# Patient Record
Sex: FEMALE | Race: BLACK OR AFRICAN AMERICAN | NOT HISPANIC OR LATINO | Employment: OTHER | URBAN - METROPOLITAN AREA
[De-identification: names, ages, dates, MRNs, and addresses within clinical notes are randomized per-mention and may not be internally consistent; named-entity substitution may affect disease eponyms.]

---

## 2020-12-18 DIAGNOSIS — Z12.31 ENCOUNTER FOR SCREENING MAMMOGRAM FOR BREAST CANCER: Primary | ICD-10-CM

## 2021-09-08 DIAGNOSIS — R10.13 EPIGASTRIC PAIN: Primary | ICD-10-CM

## 2021-09-17 ENCOUNTER — HOSPITAL ENCOUNTER (OUTPATIENT)
Dept: RADIOLOGY | Facility: HOSPITAL | Age: 56
Discharge: HOME | End: 2021-09-17
Attending: NURSE PRACTITIONER
Payer: MEDICARE

## 2021-09-17 DIAGNOSIS — R10.13 EPIGASTRIC PAIN: ICD-10-CM

## 2021-09-17 PROCEDURE — 76700 US EXAM ABDOM COMPLETE: CPT | Mod: TC,PO

## 2021-12-06 ENCOUNTER — TELEPHONE (OUTPATIENT)
Dept: SURGERY | Facility: CLINIC | Age: 56
End: 2021-12-06
Payer: MEDICARE

## 2022-01-11 ENCOUNTER — OFFICE VISIT (OUTPATIENT)
Dept: SURGERY | Facility: CLINIC | Age: 57
End: 2022-01-11
Payer: MEDICARE

## 2022-01-11 VITALS — DIASTOLIC BLOOD PRESSURE: 71 MMHG | TEMPERATURE: 98 F | HEART RATE: 112 BPM | SYSTOLIC BLOOD PRESSURE: 136 MMHG

## 2022-01-11 DIAGNOSIS — K80.10 CHRONIC CALCULOUS CHOLECYSTITIS: Primary | ICD-10-CM

## 2022-01-11 PROCEDURE — 99204 OFFICE O/P NEW MOD 45 MIN: CPT | Mod: S$GLB,,, | Performed by: SURGERY

## 2022-01-11 PROCEDURE — 99204 PR OFFICE/OUTPT VISIT, NEW, LEVL IV, 45-59 MIN: ICD-10-PCS | Mod: S$GLB,,, | Performed by: SURGERY

## 2022-01-11 RX ORDER — METFORMIN HYDROCHLORIDE 1000 MG/1
1000 TABLET ORAL 2 TIMES DAILY
COMMUNITY
Start: 2021-09-10

## 2022-01-11 RX ORDER — LISINOPRIL AND HYDROCHLOROTHIAZIDE 12.5; 2 MG/1; MG/1
1 TABLET ORAL DAILY
COMMUNITY

## 2022-01-11 RX ORDER — AMLODIPINE BESYLATE 10 MG/1
10 TABLET ORAL DAILY
COMMUNITY

## 2022-01-11 RX ORDER — ROSUVASTATIN CALCIUM 40 MG/1
40 TABLET, COATED ORAL DAILY
COMMUNITY

## 2022-01-11 NOTE — PROGRESS NOTES
Subjective:       Patient ID: Papito Alberto is a 56 y.o. female.    Chief Complaint: Consult (Gallbladder )      HPI:  56 year old female referred to the office with biliary symptoms and gallstones on imaging. She first noticed RUQ and epigastric abdominal pain seven months ago. Pain worse with meals and also associated with nausea. US shows gallstones with largest measuring 13 mm. She states symptoms are daily. She has no fever of chills. She has past surgical history of .     No past medical history on file.  No past surgical history on file.  Review of patient's allergies indicates:  No Known Allergies  Medication List with Changes/Refills   Current Medications    AMLODIPINE (NORVASC) 10 MG TABLET    amlodipine 10 mg tablet   TAKE 1 TABLET BY MOUTH ONCE DAILY    LISINOPRIL-HYDROCHLOROTHIAZIDE (PRINZIDE,ZESTORETIC) 20-12.5 MG PER TABLET    lisinopril 20 mg-hydrochlorothiazide 12.5 mg tablet   TAKE 1 TABLET BY MOUTH ONCE DAILY    METFORMIN (GLUCOPHAGE) 1000 MG TABLET    Take 1,000 mg by mouth 2 (two) times daily.    ROSUVASTATIN (CRESTOR) 40 MG TAB    Take 40 mg by mouth.     No family history on file.  Social History     Socioeconomic History    Marital status:          Review of Systems   Constitutional: Negative for appetite change, chills, fever and unexpected weight change.   HENT: Negative for hearing loss, rhinorrhea, sore throat and voice change.    Eyes: Negative for photophobia and visual disturbance.   Respiratory: Negative for cough, choking and shortness of breath.    Cardiovascular: Negative for chest pain, palpitations and leg swelling.   Gastrointestinal: Positive for abdominal pain and nausea. Negative for blood in stool, constipation, diarrhea and vomiting.   Endocrine: Negative for cold intolerance, heat intolerance, polydipsia and polyuria.   Musculoskeletal: Negative for arthralgias, back pain, joint swelling and neck stiffness.   Skin: Negative for color change, pallor and  rash.   Neurological: Negative for dizziness, seizures, syncope and headaches.   Hematological: Negative for adenopathy. Does not bruise/bleed easily.   Psychiatric/Behavioral: Negative for agitation, behavioral problems and confusion.       Objective:      Physical Exam  Constitutional:       General: She is not in acute distress.     Appearance: Normal appearance. She is well-developed and well-nourished. She is not toxic-appearing.   HENT:      Head: Normocephalic and atraumatic. No abrasion or laceration.      Right Ear: External ear normal.      Left Ear: External ear normal.      Nose: Nose normal.      Mouth/Throat:      Mouth: Oropharynx is clear and moist.   Eyes:      Extraocular Movements: EOM normal.      Pupils: Pupils are equal, round, and reactive to light.   Neck:      Trachea: Trachea and phonation normal. No tracheal deviation.   Cardiovascular:      Rate and Rhythm: Normal rate and regular rhythm.   Pulmonary:      Effort: Pulmonary effort is normal. No tachypnea, accessory muscle usage or respiratory distress.   Abdominal:      General: There is no distension.      Palpations: Abdomen is soft. There is no hepatosplenomegaly or mass.      Tenderness: There is no abdominal tenderness. There is no guarding or rebound.   Musculoskeletal:      Cervical back: Neck supple. Normal range of motion.   Lymphadenopathy:      Cervical: No cervical adenopathy.      Upper Body:   No axillary adenopathy present.     Lower Body: No right inguinal adenopathy. No left inguinal adenopathy.   Skin:     General: Skin is warm and intact.   Neurological:      Mental Status: She is alert and oriented to person, place, and time.      Coordination: Coordination normal.      Gait: Gait normal.   Psychiatric:         Mood and Affect: Mood and affect normal.         Speech: Speech normal.         Behavior: Behavior normal.         Assessment/Plan:   Papito was seen today for consult.    Diagnoses and all orders for this  visit:    Chronic calculous cholecystitis  -     Full code; Standing  -     Insert peripheral IV; Standing  -     Case Request Operating Room: CHOLECYSTECTOMY, LAPAROSCOPIC  -     Comprehensive metabolic panel; Future  -     CBC auto differential; Future  -     EKG 12-lead; Future  -     X-Ray Chest PA And Lateral; Future  -     Full code  -     Insert peripheral IV    Other orders  -     ceFOXItin (MEFOXIN) 2 g in dextrose 5 % 50 mL IVPB        Patient has biliary symptoms with gallstones on imaging.  Will plan on cholecystectomy February 2nd.  Risks and benefits of surgery discussed with the patient.      I discussed the proposed procedures with the patient including risks, benefits, indications, alternatives and special concerns.  The patient appears to understand and agrees to go ahead with surgery.  I have made no promises, warranties or verbal agreements beyond what was discussed above.    No follow-ups on file.

## 2022-01-28 ENCOUNTER — HOSPITAL ENCOUNTER (OUTPATIENT)
Dept: PREADMISSION TESTING | Facility: HOSPITAL | Age: 57
Discharge: HOME | End: 2022-01-28
Attending: SURGERY
Payer: MEDICARE

## 2022-01-28 ENCOUNTER — HOSPITAL ENCOUNTER (OUTPATIENT)
Dept: RADIOLOGY | Facility: HOSPITAL | Age: 57
Discharge: HOME | End: 2022-01-28
Attending: SURGERY
Payer: MEDICARE

## 2022-01-28 VITALS
OXYGEN SATURATION: 99 % | DIASTOLIC BLOOD PRESSURE: 80 MMHG | WEIGHT: 220 LBS | HEIGHT: 65 IN | RESPIRATION RATE: 18 BRPM | HEART RATE: 86 BPM | BODY MASS INDEX: 36.65 KG/M2 | SYSTOLIC BLOOD PRESSURE: 118 MMHG

## 2022-01-28 DIAGNOSIS — K80.10 CHRONIC CALCULOUS CHOLECYSTITIS: ICD-10-CM

## 2022-01-28 DIAGNOSIS — Z01.818 PRE-OP TESTING: Primary | ICD-10-CM

## 2022-01-28 PROCEDURE — 71046 X-RAY EXAM CHEST 2 VIEWS: CPT | Mod: TC

## 2022-01-28 NOTE — DISCHARGE INSTRUCTIONS
To confirm, Your doctor has instructed you that surgery is scheduled for:   Wednesday, February 2, 2022    Pre-Op will call the afternoon prior to surgery between 4:00 and 6:00 PM with the final arrival time.    Tuesday, February 1, 2022    Please report to [facility] [place] via [facility] entrance. Check in at registration desk.    Do not eat or drink anything after midnight the night before your surgery - THIS INCLUDES  WATER, GUM, MINTS AND CANDY.  YOU MAY BRUSH YOUR TEETH BUT DO NOT SWALLOW     TAKE ONLY THESE MEDICATIONS WITH A SMALL SIP OF WATER THE MORNING OF YOUR PROCEDURE:  AMLODIPINE      ONLY if you are diabetic, check your sugar in the morning before your procedure.       Do not take any diabetic medicines or insulin the morning of surgery .     PLEASE NOTE:  The surgery schedule has many variables which may affect the time of your surgery case.  Family members should be available if your surgery time changes.  Plan to be here the day of your procedure between 4-6 hours.      DO NOT TAKE THESE MEDICATIONS 5-7 DAYS PRIOR to your procedure or per your surgeon's request: ASPIRIN, ALEVE, ADVIL, IBUPROFEN,  LANI SELTZER, BC , FISH OIL , VITAMIN E, HERBALS  (May take Tylenol)                                                          IMPORTANT INSTRUCTIONS    · Do not smoke, vape or drink alcoholic beverages 24 hours prior to your procedure.  · Shower the night before AND the morning of your procedure with a Chlorhexidine wash such as Hibiclens or Dial antibacterial soap from the neck down.   ·  Do not get it on your face or in your eyes.  You may use your own shampoo and face wash. This helps your skin to be as bacteria free as possible.   ·  DO NOT remove hair from the surgery site.  Do not shave the incision site unless you are given specific instructions to do so.    · Sleep in a bed with clean sheets.  Do not sleep with a pet in the bed.   · Please leave all jewelry, piercing's and valuables at home.      · Make arrangements in advance for transportation home by a responsible adult.    · You must make arrangements for transportation, TAXI'S, UBER'S OR LYFTS ARE NOT ALLOWED.      If you have any questions about these instructions, call Pre-Op Admit  Nursing at [phone] or the Pre-Op Day Surgery Unit at [phone].

## 2022-01-31 ENCOUNTER — HOSPITAL ENCOUNTER (OUTPATIENT)
Dept: PREADMISSION TESTING | Facility: HOSPITAL | Age: 57
Discharge: HOME | End: 2022-01-31
Attending: SURGERY
Payer: MEDICARE

## 2022-01-31 DIAGNOSIS — Z01.818 PREOP TESTING: Primary | ICD-10-CM

## 2022-01-31 PROCEDURE — 93010 EKG 12-LEAD: ICD-10-PCS | Mod: ,,, | Performed by: INTERNAL MEDICINE

## 2022-01-31 PROCEDURE — 93005 ELECTROCARDIOGRAM TRACING: CPT | Performed by: INTERNAL MEDICINE

## 2022-01-31 PROCEDURE — 93010 ELECTROCARDIOGRAM REPORT: CPT | Mod: ,,, | Performed by: INTERNAL MEDICINE

## 2022-01-31 NOTE — PLAN OF CARE
0918 Spoke w/ Dr. Dominga morales. Abnormal EKG. He requests clearance from pt's primary care md. Pt made aware and states she will go to her primary care md office now and see if they can see her today. She sees Erin Mittal NP here in [place].

## 2022-02-01 DIAGNOSIS — R94.31 ABNORMAL EKG: Primary | ICD-10-CM

## 2022-02-02 ENCOUNTER — PATIENT MESSAGE (OUTPATIENT)
Dept: FAMILY MEDICINE | Facility: CLINIC | Age: 57
End: 2022-02-02
Payer: MEDICARE

## 2022-02-10 ENCOUNTER — TELEPHONE (OUTPATIENT)
Dept: SURGERY | Facility: CLINIC | Age: 57
End: 2022-02-10
Payer: MEDICARE

## 2022-02-10 ENCOUNTER — TELEPHONE (OUTPATIENT)
Dept: CARDIOLOGY | Facility: CLINIC | Age: 57
End: 2022-02-10
Payer: MEDICARE

## 2022-02-10 NOTE — TELEPHONE ENCOUNTER
----- Message from Ryne Kimble sent at 2/10/2022 12:54 PM CST -----  Type:  Sooner Apoointment Request    Caller is requesting a sooner appointment.  Caller declined first available appointment listed below.  Caller will not accept being placed on the waitlist and is requesting a message be sent to doctor.    Name of Caller:  Nicki ramirez/ Chandler Pittman  When is the first available appointment?  April  Symptoms:  Needs to be evaluated for abnormal EKG   Best Call Back Number:  [phone]   Additional Information:  Wants to be called about getting Pt scheduled as soon as possible.  Please advise -- Thank you

## 2022-02-10 NOTE — TELEPHONE ENCOUNTER
Spoke with patient confirmed appointment scheduled with Dr Sharp /cardiology 3:45 2/21/22. Understanding verbalized

## 2022-02-21 ENCOUNTER — OFFICE VISIT (OUTPATIENT)
Dept: CARDIOLOGY | Facility: CLINIC | Age: 57
End: 2022-02-21
Payer: MEDICARE

## 2022-02-21 VITALS
WEIGHT: 220 LBS | DIASTOLIC BLOOD PRESSURE: 80 MMHG | RESPIRATION RATE: 16 BRPM | HEIGHT: 65 IN | SYSTOLIC BLOOD PRESSURE: 130 MMHG | HEART RATE: 87 BPM | OXYGEN SATURATION: 98 % | BODY MASS INDEX: 36.65 KG/M2

## 2022-02-21 DIAGNOSIS — Z87.19 HISTORY OF CHOLECYSTITIS: ICD-10-CM

## 2022-02-21 DIAGNOSIS — E78.5 DYSLIPIDEMIA: ICD-10-CM

## 2022-02-21 DIAGNOSIS — E11.9 TYPE 2 DIABETES MELLITUS WITHOUT COMPLICATION, WITHOUT LONG-TERM CURRENT USE OF INSULIN: ICD-10-CM

## 2022-02-21 DIAGNOSIS — R94.31 ABNORMAL EKG: ICD-10-CM

## 2022-02-21 DIAGNOSIS — I10 ESSENTIAL HYPERTENSION: Primary | ICD-10-CM

## 2022-02-21 DIAGNOSIS — R94.31 NONSPECIFIC ABNORMAL ELECTROCARDIOGRAM (ECG) (EKG): ICD-10-CM

## 2022-02-21 PROCEDURE — 99204 OFFICE O/P NEW MOD 45 MIN: CPT | Mod: S$GLB,,, | Performed by: INTERNAL MEDICINE

## 2022-02-21 PROCEDURE — 93000 ELECTROCARDIOGRAM COMPLETE: CPT | Mod: S$GLB,,, | Performed by: INTERNAL MEDICINE

## 2022-02-21 PROCEDURE — 99204 PR OFFICE/OUTPT VISIT, NEW, LEVL IV, 45-59 MIN: ICD-10-PCS | Mod: S$GLB,,, | Performed by: INTERNAL MEDICINE

## 2022-02-21 PROCEDURE — 93000 EKG 12-LEAD: ICD-10-PCS | Mod: S$GLB,,, | Performed by: INTERNAL MEDICINE

## 2022-02-21 NOTE — ASSESSMENT & PLAN NOTE
Patient is recommended to have further evaluation as a part of preop evaluation.  Encouraged her to some obtain a stress echo to evaluate for any coronary insufficiency.  And also full echocardiogram for LV function assessment in view of longstanding arterial hypertension

## 2022-02-21 NOTE — ASSESSMENT & PLAN NOTE
Continue on metformin at this time 1000 b.i.d. monitor blood sugars and follow-up with primary care

## 2022-02-21 NOTE — ASSESSMENT & PLAN NOTE
She is presently on risk factor modification with Crestor 40 mg a day encouraged to continue same along with low-fat low-cholesterol diet.

## 2022-02-21 NOTE — ASSESSMENT & PLAN NOTE
Continue on amlodipine 10 mg daily are.  She is on lisinopril hydrochlorothiazide 20/12.5 mg daily.  Encouraged her to maintain

## 2022-02-21 NOTE — PROGRESS NOTES
Subjective:    Patient ID:  Papito Alberto is a 56 y.o. female patient here for evaluation Abnormal ECG (Went in for pre op for her gallbladder removal and her ekg was abnormal)      History of Present Illness:     Patient is a 56-year-old lady who is scheduled to have gallbladder surgery was diagnosed to have an abnormal EKG.  Her surgery was canceled after preop evaluation was completed and she is now referred to have cardiac evaluation.  Patient denies having chest discomfort arm neck or jaw pain.  And effort capacity is good some fatigue is noted on occasion.  A last week and she had some discomfort in the middle of her left arm otherwise no new symptoms of chest pain arm neck or jaw pain noted no cough or congestion no fevers or chills she had all 3 COVID vaccines.  From a gallbladder standpoint she has been improved.  Now acute abdominal pains no fevers or chills no nausea vomiting noted.    2022 EKG shows normal sinus rhythm left atrial enlargement possible septal infarct aid age indeterminate possible lateral infarct age indeterminate    Review of patient's allergies indicates:  No Known Allergies    Past Medical History:   Diagnosis Date    Diabetes mellitus 2019    Hyperlipidemia 2017    Hypertension 2013    TIA (transient ischemic attack) 2013    x2 2013 2016     Past Surgical History:   Procedure Laterality Date     SECTION  [id number]    FUSION OF FACETS OF LUMBAR SPINE USING MINIMALLY INVASIVE TECHNIQUE      PARTIAL HYSTERECTOMY       Social History     Tobacco Use    Smoking status: Never Smoker    Smokeless tobacco: Never Used   Substance Use Topics    Alcohol use: Yes     Comment: occassionally    Drug use: Never        Review of Systems   As noted in HPI in addition     Constitutional: Negative for chills, fatigue and fever.   Eyes: No double vision, No blurred vision  Neuro: No headaches, No dizziness  Respiratory: Negative for cough, shortness of  breath and wheezing.    Cardiovascular: Negative for chest pain. Negative for palpitations and leg swelling.   Gastrointestinal: Negative for abdominal pain, No melena, diarrhea, nausea and vomiting.   Genitourinary: Negative for dysuria and frequency, Negative for hematuria  Skin: Negative for bruising, Negative for edema or discoloration noted.   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       Objective        Vitals:    02/21/22 1701   BP: 130/80   Pulse: 87   Resp: 16       LIPIDS - LAST 2   No results found for: CHOL, HDL, LDLCALC, TRIG, CHOLHDL    CBC - LAST 2  Lab Results   Component Value Date    WBC 5.83 01/28/2022    RBC 4.52 01/28/2022    HGB 13.2 01/28/2022    HCT 40.3 01/28/2022    MCV 89 01/28/2022    MCH 29.2 01/28/2022    MCHC 32.8 01/28/2022    RDW 14.7 (H) 01/28/2022     01/28/2022    MPV 8.9 (L) 01/28/2022    GRAN 2.4 01/28/2022    GRAN 40.9 01/28/2022    LYMPH 2.9 01/28/2022    LYMPH 49.1 (H) 01/28/2022    MONO 0.5 01/28/2022    MONO 7.7 01/28/2022    BASO 0.04 01/28/2022    NRBC 0 01/28/2022       CHEMISTRY & LIVER FUNCTION - LAST 2  Lab Results   Component Value Date     01/28/2022    K 3.6 01/28/2022    CL 99 01/28/2022    CO2 28 01/28/2022    ANIONGAP 11 01/28/2022    BUN 10 01/28/2022    CREATININE 1.1 01/28/2022     (H) 01/28/2022    CALCIUM 9.5 01/28/2022    ALBUMIN 4.7 01/28/2022    PROT 8.6 (H) 01/28/2022    ALKPHOS 69 01/28/2022    ALT 18 01/28/2022    AST 25 01/28/2022    BILITOT 0.6 01/28/2022        CARDIAC PROFILE - LAST 2  No results found for: BNP, CPK, CPKMB, LDH, TROPONINI     COAGULATION - LAST 2  No results found for: LABPT, INR,  APTT    ENDOCRINE & PSA - LAST 2  No results found for: HGBA1C, MICROALBUR, TSH, PROCAL, PSA     ECHOCARDIOGRAM RESULTS  No results found for this or any previous visit.      CURRENT/PREVIOUS VISIT EKG  Results for orders placed or performed during the hospital encounter of 01/31/22   EKG 12-lead    Collection Time: 01/31/22  9:08 AM    Narrative    Test Reason : Z01.818,    Vent. Rate : 080 BPM     Atrial Rate : 080 BPM     P-R Int : 184 ms          QRS Dur : 068 ms      QT Int : 398 ms       P-R-T Axes : 057 038 050 degrees     QTc Int : 459 ms    Normal sinus rhythm  Septal infarct ,age undetermined  Abnormal ECG  No previous ECGs available  Confirmed by Butch Sharp MD ([id number]) on 2/7/2022 9:11:05 PM    Referred By:  ISABELLE           Confirmed By:Butch Sharp MD     No valid procedures specified.   No results found for this or any previous visit.    No valid procedures specified.          PREVIOUS STRESS TEST              PREVIOUS ANGIOGRAM        PHYSICAL EXAM    GENERAL: well built, well nourished, well-developed in no apparent distress alert and oriented.   HEENT: Normocephalic. Pupils normal and conjunctivae normal.  Mucous membranes normal, no cyanosis or icterus, trachea central,no pallor or icterus is noted..   NECK: No JVD. No bruit..   THYROID: Thyroid not enlarged. No nodules present..   CARDIAC: Regular rate and rhythm. S1 is normal.S2 is normal.No gallops, clicks or murmurs noted at this time.  CHEST ANATOMY: normal.   LUNGS: Clear to auscultation. No wheezing or rhonchi..   ABDOMEN: Soft no masses or organomegaly.  No abdomen pulsations or bruits.  Normal bowel sounds. No pulsations and no masses felt, No guarding or rebound.   EXTREMITIES: No cyanosis, clubbing or edema noted at this time., no calf tenderness bilaterally.   PERIPHERAL VASCULAR SYSTEM: Good palpable distal pulses.   CENTRAL NERVOUS SYSTEM: No focal motor or sensory deficits noted.   SKIN: Skin without lesions, moist, well  perfused.   MUSCLE STRENGTH & TONE: No noteable weakness, atrophy or abnormal movement.     I HAVE REVIEWED :    The vital signs, nurses notes, and all the pertinent radiology and labs.        Current Outpatient Medications   Medication Instructions    amLODIPine (NORVASC) 10 mg, Oral, Daily    APPLE CIDER VINEGAR ORAL 1 tablet, Oral, Daily    lisinopriL-hydrochlorothiazide (PRINZIDE,ZESTORETIC) 20-12.5 mg per tablet 1 tablet, Oral, Daily    metFORMIN (GLUCOPHAGE) 1,000 mg, Oral, 2 times daily    multivitamin capsule 1 capsule, Oral, Daily    rosuvastatin (CRESTOR) 40 mg, Oral, Daily          Assessment & Plan     Nonspecific abnormal electrocardiogram (ECG) (EKG)  Patient is recommended to have further evaluation as a part of preop evaluation.  Encouraged her to some obtain a stress echo to evaluate for any coronary insufficiency.  And also full echocardiogram for LV function assessment in view of longstanding arterial hypertension    History of cholecystitis  Shows a post undergo elective cholecystectomy in the meanwhile she had developed abnormal EKG now referred for cardiac evaluation once cleared she has plans to have surgical are intervention regarding the same    Essential hypertension  Continue on amlodipine 10 mg daily are.  She is on lisinopril hydrochlorothiazide 20/12.5 mg daily.  Encouraged her to maintain    Dyslipidemia  She is presently on risk factor modification with Crestor 40 mg a day encouraged to continue same along with low-fat low-cholesterol diet.    Type 2 diabetes mellitus without complication, without long-term current use of insulin  Continue on metformin at this time 1000 b.i.d. monitor blood sugars and follow-up with primary care          No follow-ups on file.

## 2022-02-21 NOTE — ASSESSMENT & PLAN NOTE
Shows a post undergo elective cholecystectomy in the meanwhile she had developed abnormal EKG now referred for cardiac evaluation once cleared she has plans to have surgical are intervention regarding the same

## 2022-02-23 ENCOUNTER — TELEPHONE (OUTPATIENT)
Dept: CARDIOLOGY | Facility: CLINIC | Age: 57
End: 2022-02-23
Payer: MEDICARE

## 2022-02-23 NOTE — TELEPHONE ENCOUNTER
----- Message from Nguyen Virk NP sent at 2/23/2022 10:27 AM CST -----  Regarding: FW: Abnormal EKG    ----- Message -----  From: Nicki Alvarez LPN  Sent: 2/10/2022   1:00 PM CST  To: Butch Sharp MD  Subject: Abnormal EKG                                     Patient had an abnormal EKG during pre op for Surgery. Pt complaining of increased pain/ discomfort. Per Dr Pittman Pt needs appointment / clearance to move forward with procedure  Referral been in que since 2/1/2022. Please review and advise. Tks

## 2022-03-16 ENCOUNTER — CLINICAL SUPPORT (OUTPATIENT)
Dept: CARDIOLOGY | Facility: HOSPITAL | Age: 57
End: 2022-03-16
Attending: INTERNAL MEDICINE
Payer: MEDICARE

## 2022-03-16 DIAGNOSIS — E11.9 TYPE 2 DIABETES MELLITUS WITHOUT COMPLICATION, WITHOUT LONG-TERM CURRENT USE OF INSULIN: ICD-10-CM

## 2022-03-16 DIAGNOSIS — I10 ESSENTIAL HYPERTENSION: ICD-10-CM

## 2022-03-16 DIAGNOSIS — R94.31 ABNORMAL EKG: ICD-10-CM

## 2022-03-16 DIAGNOSIS — E78.5 DYSLIPIDEMIA: ICD-10-CM

## 2022-03-16 LAB
CV STRESS BASE HR: 85 BPM
DIASTOLIC BLOOD PRESSURE: 79 MMHG
EJECTION FRACTION: 60 %
OHS CV CPX 1 MINUTE RECOVERY HEART RATE: 134 BPM
OHS CV CPX 85 PERCENT MAX PREDICTED HEART RATE MALE: 133
OHS CV CPX ESTIMATED METS: 8
OHS CV CPX MAX PREDICTED HEART RATE: 157
OHS CV CPX PATIENT IS FEMALE: 1
OHS CV CPX PATIENT IS MALE: 0
OHS CV CPX PEAK DIASTOLIC BLOOD PRESSURE: 95 MMHG
OHS CV CPX PEAK HEAR RATE: 159 BPM
OHS CV CPX PEAK RATE PRESSURE PRODUCT: NORMAL
OHS CV CPX PEAK SYSTOLIC BLOOD PRESSURE: 184 MMHG
OHS CV CPX PERCENT MAX PREDICTED HEART RATE ACHIEVED: 101
OHS CV CPX RATE PRESSURE PRODUCT PRESENTING: 9180
STRESS ANGINA INDEX: 0
STRESS ECHO POST EXERCISE DUR MIN: 6 MINUTES
STRESS ECHO POST EXERCISE DUR SEC: 30 SECONDS
SYSTOLIC BLOOD PRESSURE: 108 MMHG

## 2022-03-16 PROCEDURE — 93306 TTE W/DOPPLER COMPLETE: CPT | Mod: 59

## 2022-03-16 PROCEDURE — 93351 STRESS TTE COMPLETE: CPT

## 2022-03-16 PROCEDURE — 93306 TTE W/DOPPLER COMPLETE: CPT | Mod: 26,,, | Performed by: INTERNAL MEDICINE

## 2022-03-16 PROCEDURE — 93351 STRESS TTE COMPLETE: CPT | Mod: 26,,, | Performed by: GENERAL PRACTICE

## 2022-03-16 PROCEDURE — 93351 STRESS ECHO (CUPID ONLY): ICD-10-PCS | Mod: 26,,, | Performed by: GENERAL PRACTICE

## 2022-03-16 PROCEDURE — 93306 ECHO (CUPID ONLY): ICD-10-PCS | Mod: 26,,, | Performed by: INTERNAL MEDICINE

## 2022-03-17 LAB
AORTIC ROOT ANNULUS: 3.16 CM
AORTIC VALVE CUSP SEPERATION: 1.72 CM
AV INDEX (PROSTH): 1.06
AV MEAN GRADIENT: 3 MMHG
AV PEAK GRADIENT: 5 MMHG
AV VALVE AREA: 3.43 CM2
AV VELOCITY RATIO: 95.32
CV ECHO LV RWT: 0.48 CM
DOP CALC AO PEAK VEL: 1.15 M/S
DOP CALC AO VTI: 21.22 CM
DOP CALC LVOT AREA: 3.2 CM2
DOP CALC LVOT DIAMETER: 2.03 CM
DOP CALC LVOT PEAK VEL: 109.62 M/S
DOP CALC LVOT STROKE VOLUME: 72.75 CM3
DOP CALCLVOT PEAK VEL VTI: 22.49 CM
E WAVE DECELERATION TIME: 248.63 MSEC
E/A RATIO: 0.71
E/E' RATIO: 8.67 M/S
ECHO LV POSTERIOR WALL: 0.97 CM (ref 0.6–1.1)
EJECTION FRACTION: 70 %
FRACTIONAL SHORTENING: 33 % (ref 28–44)
INTERVENTRICULAR SEPTUM: 1.05 CM (ref 0.6–1.1)
IVC DIAMETER: 1.2 CM
LEFT ATRIUM SIZE: 2.91 CM
LEFT INTERNAL DIMENSION IN SYSTOLE: 2.71 CM (ref 2.1–4)
LEFT VENTRICLE DIASTOLIC VOLUME: 66.6 ML
LEFT VENTRICLE SYSTOLIC VOLUME: 24.1 ML
LEFT VENTRICULAR INTERNAL DIMENSION IN DIASTOLE: 4.04 CM (ref 3.5–6)
LEFT VENTRICULAR MASS: 130.9 G
LV LATERAL E/E' RATIO: 8.13 M/S
LV SEPTAL E/E' RATIO: 9.29 M/S
MV PEAK A VEL: 0.91 M/S
MV PEAK E VEL: 0.65 M/S
PISA TR MAX VEL: 2.05 M/S
PV PEAK VELOCITY: 88 CM/S
RA PRESSURE: 3 MMHG
RIGHT VENTRICULAR END-DIASTOLIC DIMENSION: 335 CM
TDI LATERAL: 0.08 M/S
TDI SEPTAL: 0.07 M/S
TDI: 0.08 M/S
TR MAX PG: 17 MMHG
TV REST PULMONARY ARTERY PRESSURE: 20 MMHG

## 2022-03-24 ENCOUNTER — TELEPHONE (OUTPATIENT)
Dept: SURGERY | Facility: HOSPITAL | Age: 57
End: 2022-03-24
Payer: MEDICARE

## 2022-03-24 NOTE — TELEPHONE ENCOUNTER
Spoke with Patient informed Stress and 2 d echo results ok will forward to Dr Yin to review and advise. Schedule surgery date. understanding verbalized

## 2022-03-24 NOTE — TELEPHONE ENCOUNTER
----- Message from Ashley Dallas sent at 3/24/2022 12:12 PM CDT -----  Contact: Via Portal  Patient has requested an appointment using the portal. Please contact them to assist further.        Appointment Request From: Papito Alberto    With Provider: Vasiliy Yin III, MD [[facility] - General Surgery]    Preferred Date Range: Any    Preferred Times: Any Time    Reason for visit: Reschedule surgery for gallblader removal.    Comments:  I had testing done with my cardiologist. He said I'm good too go. Ready for surgery.

## 2022-03-24 NOTE — TELEPHONE ENCOUNTER
----- Message from Ashley Dallas sent at 3/24/2022 12:12 PM CDT -----  Contact: Via Portal  Patient has requested an appointment using the portal. Please contact them to assist further.        Appointment Request From: Papito Alberto    With Provider: Vasiliy Yin III, MD [[facility] - General Surgery]    Preferred Date Range: Any    Preferred Times: Any Time    Reason for visit: Reschedule surgery for gallblader removal.    Comments:  I had testing done with my cardiologist. He said I'm good too go. Ready for surgery.

## 2022-04-18 ENCOUNTER — PATIENT MESSAGE (OUTPATIENT)
Dept: SURGERY | Facility: CLINIC | Age: 57
End: 2022-04-18
Payer: MEDICARE

## 2022-04-26 DIAGNOSIS — K80.10 CHRONIC CALCULOUS CHOLECYSTITIS: Primary | ICD-10-CM

## 2022-04-26 RX ORDER — CEFOXITIN SODIUM 2 G/50ML
2 INJECTION, SOLUTION INTRAVENOUS
Status: DISCONTINUED | OUTPATIENT
Start: 2022-04-26 | End: 2022-04-26 | Stop reason: HOSPADM

## 2022-04-28 ENCOUNTER — TELEPHONE (OUTPATIENT)
Dept: SURGERY | Facility: CLINIC | Age: 57
End: 2022-04-28
Payer: MEDICARE

## 2022-04-28 NOTE — TELEPHONE ENCOUNTER
----- Message from Faith Garay sent at 4/28/2022 12:53 PM CDT -----  Type:  Patient Returning Call    Who Called:  PT  Who Left Message for Patient:  Nicki  Does the patient know what this is regarding?:  Yes  Best Call Back Number:  [phone]  Additional Information:  Please call and advise

## 2022-04-29 ENCOUNTER — HOSPITAL ENCOUNTER (OUTPATIENT)
Dept: PREADMISSION TESTING | Facility: HOSPITAL | Age: 57
Discharge: HOME | End: 2022-04-29
Attending: SURGERY
Payer: MEDICARE

## 2022-04-29 ENCOUNTER — TELEPHONE (OUTPATIENT)
Dept: CARDIOLOGY | Facility: CLINIC | Age: 57
End: 2022-04-29

## 2022-04-29 VITALS
TEMPERATURE: 98 F | HEART RATE: 92 BPM | RESPIRATION RATE: 18 BRPM | OXYGEN SATURATION: 98 % | BODY MASS INDEX: 36.82 KG/M2 | WEIGHT: 221 LBS | DIASTOLIC BLOOD PRESSURE: 84 MMHG | SYSTOLIC BLOOD PRESSURE: 118 MMHG | HEIGHT: 65 IN

## 2022-04-29 NOTE — DISCHARGE INSTRUCTIONS
To confirm, Your doctor has instructed you that surgery is scheduled for:   Monday, May 2, 2022    Pre-Op will call the afternoon prior to surgery between 4:00 and 6:00 PM with the final arrival time.      Please report to [facility] [place] via [facility] entrance. Check in at registration desk.    Do not eat or drink anything after midnight the night before your surgery - THIS INCLUDES  WATER, GUM, MINTS AND CANDY.  YOU MAY BRUSH YOUR TEETH BUT DO NOT SWALLOW     TAKE ONLY THESE MEDICATIONS WITH A SMALL SIP OF WATER THE MORNING OF YOUR PROCEDURE:  AMLODIPINE      ONLY if you are diabetic, check your sugar in the morning before your procedure.       Do not take any diabetic medicines or insulin the morning of surgery .     PLEASE NOTE:  The surgery schedule has many variables which may affect the time of your surgery case.  Family members should be available if your surgery time changes.  Plan to be here the day of your procedure between 4-6 hours.      DO NOT TAKE THESE MEDICATIONS 5-7 DAYS PRIOR to your procedure or per your surgeon's request: ASPIRIN, ALEVE, ADVIL, IBUPROFEN,  LANI SELTZER, BC , FISH OIL , VITAMIN E, HERBALS  (May take Tylenol)                                                       IMPORTANT INSTRUCTIONS    Shower the night before AND the morning of your procedure with a Chlorhexidine wash such as Hibiclens or Dial antibacterial soap from the neck down.    Do not get it on your face or in your eyes.  You may use your own shampoo and face wash. This helps your skin to be as bacteria free as possible.   Sleep in a bed with clean sheets.  Do not sleep with a pet in the bed.   Please leave all jewelry, piercing's and valuables at home.     Make arrangements in advance for transportation home by a responsible adult.    You must make arrangements for transportation, TAXI'S, UBER'S OR LYFTS ARE NOT ALLOWED.      If you have any questions about these instructions, call Pre-Op Admit  Nursing at  [phone] or the Pre-Op Day Surgery Unit at [phone].

## 2022-04-29 NOTE — LETTER
2022    Papito Alberto  [address]  [place] [address]             [facility] - Cardiology  [address], [address]  [place] [address]  Phone: [phone]  Fax: [phone] Patient: Papito Alberto  : 1965  Referring Doctor: Dr. Yin  Procedure: Cholecystectomy    Current Outpatient Medications   Medication Sig    amLODIPine (NORVASC) 10 MG tablet Take 10 mg by mouth once daily.    lisinopriL-hydrochlorothiazide (PRINZIDE,ZESTORETIC) 20-12.5 mg per tablet Take 1 tablet by mouth once daily.    metFORMIN (GLUCOPHAGE) 1000 MG tablet Take 1,000 mg by mouth 2 (two) times daily.    multivitamin capsule Take 1 capsule by mouth once daily.    rosuvastatin (CRESTOR) 40 MG Tab Take 40 mg by mouth once daily.     No current facility-administered medications for this visit.       This patient has been assessed for risk factors for clearance of surgery with the following stipulations:    _x__ No contraindications  _x__ Recommendations for antiplatelet/anticoagulant medications: NONE  _x__ Cleared for surgery with moderate risks      If you have any questions regarding the above, please contact my office at [phone].    Sincerely,  Nguyen Virk NP

## 2022-05-02 ENCOUNTER — ANESTHESIA (OUTPATIENT)
Dept: SURGERY | Facility: HOSPITAL | Age: 57
End: 2022-05-02
Payer: MEDICARE

## 2022-05-02 ENCOUNTER — ANESTHESIA EVENT (OUTPATIENT)
Dept: SURGERY | Facility: HOSPITAL | Age: 57
End: 2022-05-02
Payer: MEDICARE

## 2022-05-02 ENCOUNTER — HOSPITAL ENCOUNTER (OUTPATIENT)
Facility: HOSPITAL | Age: 57
Discharge: HOME | End: 2022-05-02
Attending: SURGERY | Admitting: SURGERY
Payer: MEDICARE

## 2022-05-02 VITALS
DIASTOLIC BLOOD PRESSURE: 72 MMHG | RESPIRATION RATE: 16 BRPM | WEIGHT: 220.88 LBS | TEMPERATURE: 98 F | HEART RATE: 81 BPM | BODY MASS INDEX: 36.8 KG/M2 | HEIGHT: 65 IN | SYSTOLIC BLOOD PRESSURE: 124 MMHG | OXYGEN SATURATION: 97 %

## 2022-05-02 DIAGNOSIS — K80.10 CHRONIC CALCULOUS CHOLECYSTITIS: ICD-10-CM

## 2022-05-02 LAB — GLUCOSE SERPL-MCNC: 161 MG/DL (ref 70–110)

## 2022-05-02 PROCEDURE — 27202107 HC XP QUATRO SENSOR: Performed by: ANESTHESIOLOGY

## 2022-05-02 PROCEDURE — 25000003 PHARM REV CODE 250: Performed by: NURSE ANESTHETIST, CERTIFIED REGISTERED

## 2022-05-02 PROCEDURE — 63600175 PHARM REV CODE 636 W HCPCS: Performed by: NURSE ANESTHETIST, CERTIFIED REGISTERED

## 2022-05-02 PROCEDURE — 47562 PR LAP,CHOLECYSTECTOMY: ICD-10-PCS | Mod: ,,, | Performed by: SURGERY

## 2022-05-02 PROCEDURE — 82962 GLUCOSE BLOOD TEST: CPT | Performed by: SURGERY

## 2022-05-02 PROCEDURE — 37000008 HC ANESTHESIA 1ST 15 MINUTES: Performed by: SURGERY

## 2022-05-02 PROCEDURE — C9290 INJ, BUPIVACAINE LIPOSOME: HCPCS | Performed by: SURGERY

## 2022-05-02 PROCEDURE — 71000033 HC RECOVERY, INTIAL HOUR: Performed by: SURGERY

## 2022-05-02 PROCEDURE — 63600175 PHARM REV CODE 636 W HCPCS: Performed by: SURGERY

## 2022-05-02 PROCEDURE — 27000673 HC TUBING BLOOD Y: Performed by: ANESTHESIOLOGY

## 2022-05-02 PROCEDURE — 71000039 HC RECOVERY, EACH ADD'L HOUR: Performed by: SURGERY

## 2022-05-02 PROCEDURE — 25000003 PHARM REV CODE 250: Performed by: ANESTHESIOLOGY

## 2022-05-02 PROCEDURE — 36000708 HC OR TIME LEV III 1ST 15 MIN: Performed by: SURGERY

## 2022-05-02 PROCEDURE — 47562 LAPAROSCOPIC CHOLECYSTECTOMY: CPT | Mod: ,,, | Performed by: SURGERY

## 2022-05-02 PROCEDURE — 71000016 HC POSTOP RECOV ADDL HR: Performed by: SURGERY

## 2022-05-02 PROCEDURE — 27000080 OPTIME MED/SURG SUP & DEVICES GENERAL CLASSIFICATION: Performed by: SURGERY

## 2022-05-02 PROCEDURE — 25000003 PHARM REV CODE 250: Performed by: SURGERY

## 2022-05-02 PROCEDURE — 63600175 PHARM REV CODE 636 W HCPCS: Performed by: ANESTHESIOLOGY

## 2022-05-02 PROCEDURE — 27000671 HC TUBING MICROBORE EXT: Performed by: ANESTHESIOLOGY

## 2022-05-02 PROCEDURE — 37000009 HC ANESTHESIA EA ADD 15 MINS: Performed by: SURGERY

## 2022-05-02 PROCEDURE — 88304 TISSUE EXAM BY PATHOLOGIST: CPT | Mod: TC

## 2022-05-02 PROCEDURE — 27201423 OPTIME MED/SURG SUP & DEVICES STERILE SUPPLY: Performed by: SURGERY

## 2022-05-02 PROCEDURE — 36000709 HC OR TIME LEV III EA ADD 15 MIN: Performed by: SURGERY

## 2022-05-02 PROCEDURE — 27202177 HC INTRODUCER, TRACHEAL TUBE: Performed by: ANESTHESIOLOGY

## 2022-05-02 PROCEDURE — 71000015 HC POSTOP RECOV 1ST HR: Performed by: SURGERY

## 2022-05-02 RX ORDER — ROCURONIUM BROMIDE 10 MG/ML
INJECTION, SOLUTION INTRAVENOUS
Status: DISCONTINUED | OUTPATIENT
Start: 2022-05-02 | End: 2022-05-02

## 2022-05-02 RX ORDER — LIDOCAINE HYDROCHLORIDE 20 MG/ML
JELLY TOPICAL
Status: DISCONTINUED | OUTPATIENT
Start: 2022-05-02 | End: 2022-05-02

## 2022-05-02 RX ORDER — HYDROCODONE BITARTRATE AND ACETAMINOPHEN 5; 325 MG/1; MG/1
1 TABLET ORAL EVERY 6 HOURS PRN
Qty: 30 TABLET | Refills: 0 | Status: SHIPPED | OUTPATIENT
Start: 2022-05-02

## 2022-05-02 RX ORDER — PHENYLEPHRINE HYDROCHLORIDE 10 MG/ML
INJECTION INTRAVENOUS
Status: DISCONTINUED | OUTPATIENT
Start: 2022-05-02 | End: 2022-05-02

## 2022-05-02 RX ORDER — SCOLOPAMINE TRANSDERMAL SYSTEM 1 MG/1
1 PATCH, EXTENDED RELEASE TRANSDERMAL
Status: DISCONTINUED | OUTPATIENT
Start: 2022-05-02 | End: 2022-05-02 | Stop reason: HOSPADM

## 2022-05-02 RX ORDER — PROPOFOL 10 MG/ML
VIAL (ML) INTRAVENOUS
Status: DISCONTINUED | OUTPATIENT
Start: 2022-05-02 | End: 2022-05-02

## 2022-05-02 RX ORDER — MIDAZOLAM HYDROCHLORIDE 1 MG/ML
INJECTION INTRAMUSCULAR; INTRAVENOUS
Status: DISCONTINUED | OUTPATIENT
Start: 2022-05-02 | End: 2022-05-02

## 2022-05-02 RX ORDER — CEFOXITIN SODIUM 2 G/50ML
INJECTION, SOLUTION INTRAVENOUS
Status: DISCONTINUED | OUTPATIENT
Start: 2022-05-02 | End: 2022-05-02

## 2022-05-02 RX ORDER — FAMOTIDINE 10 MG/ML
INJECTION INTRAVENOUS
Status: DISCONTINUED | OUTPATIENT
Start: 2022-05-02 | End: 2022-05-02

## 2022-05-02 RX ORDER — ONDANSETRON 4 MG/1
4 TABLET, ORALLY DISINTEGRATING ORAL ONCE
Status: DISCONTINUED | OUTPATIENT
Start: 2022-05-02 | End: 2022-05-02 | Stop reason: HOSPADM

## 2022-05-02 RX ORDER — OXYCODONE HYDROCHLORIDE 5 MG/1
5 TABLET ORAL EVERY 4 HOURS PRN
Status: DISCONTINUED | OUTPATIENT
Start: 2022-05-02 | End: 2022-05-02 | Stop reason: HOSPADM

## 2022-05-02 RX ORDER — FENTANYL CITRATE 50 UG/ML
25 INJECTION, SOLUTION INTRAMUSCULAR; INTRAVENOUS EVERY 5 MIN PRN
Status: COMPLETED | OUTPATIENT
Start: 2022-05-02 | End: 2022-05-02

## 2022-05-02 RX ORDER — ONDANSETRON 2 MG/ML
INJECTION INTRAMUSCULAR; INTRAVENOUS
Status: DISCONTINUED | OUTPATIENT
Start: 2022-05-02 | End: 2022-05-02

## 2022-05-02 RX ORDER — BUPIVACAINE HYDROCHLORIDE AND EPINEPHRINE 5; 5 MG/ML; UG/ML
INJECTION, SOLUTION EPIDURAL; INTRACAUDAL; PERINEURAL
Status: DISCONTINUED | OUTPATIENT
Start: 2022-05-02 | End: 2022-05-02 | Stop reason: HOSPADM

## 2022-05-02 RX ORDER — ACETAMINOPHEN 10 MG/ML
INJECTION, SOLUTION INTRAVENOUS
Status: DISCONTINUED | OUTPATIENT
Start: 2022-05-02 | End: 2022-05-02

## 2022-05-02 RX ORDER — LIDOCAINE HYDROCHLORIDE 20 MG/ML
INJECTION, SOLUTION EPIDURAL; INFILTRATION; INTRACAUDAL; PERINEURAL
Status: DISCONTINUED | OUTPATIENT
Start: 2022-05-02 | End: 2022-05-02

## 2022-05-02 RX ORDER — ONDANSETRON 2 MG/ML
4 INJECTION INTRAMUSCULAR; INTRAVENOUS DAILY PRN
Status: DISCONTINUED | OUTPATIENT
Start: 2022-05-02 | End: 2022-05-02 | Stop reason: HOSPADM

## 2022-05-02 RX ORDER — FENTANYL CITRATE 50 UG/ML
INJECTION, SOLUTION INTRAMUSCULAR; INTRAVENOUS
Status: DISCONTINUED | OUTPATIENT
Start: 2022-05-02 | End: 2022-05-02

## 2022-05-02 RX ORDER — LIDOCAINE HYDROCHLORIDE 40 MG/ML
SOLUTION TOPICAL
Status: DISCONTINUED | OUTPATIENT
Start: 2022-05-02 | End: 2022-05-02

## 2022-05-02 RX ORDER — DIPHENHYDRAMINE HYDROCHLORIDE 50 MG/ML
6.25 INJECTION INTRAMUSCULAR; INTRAVENOUS
Status: DISCONTINUED | OUTPATIENT
Start: 2022-05-02 | End: 2022-05-02 | Stop reason: HOSPADM

## 2022-05-02 RX ORDER — SUCCINYLCHOLINE CHLORIDE 20 MG/ML
INJECTION INTRAMUSCULAR; INTRAVENOUS
Status: DISCONTINUED | OUTPATIENT
Start: 2022-05-02 | End: 2022-05-02

## 2022-05-02 RX ORDER — DIPHENHYDRAMINE HYDROCHLORIDE 50 MG/ML
INJECTION INTRAMUSCULAR; INTRAVENOUS
Status: DISCONTINUED | OUTPATIENT
Start: 2022-05-02 | End: 2022-05-02

## 2022-05-02 RX ORDER — SODIUM CHLORIDE 0.9 % (FLUSH) 0.9 %
10 SYRINGE (ML) INJECTION
Status: DISCONTINUED | OUTPATIENT
Start: 2022-05-02 | End: 2022-05-02 | Stop reason: HOSPADM

## 2022-05-02 RX ORDER — SODIUM CHLORIDE, SODIUM LACTATE, POTASSIUM CHLORIDE, CALCIUM CHLORIDE 600; 310; 30; 20 MG/100ML; MG/100ML; MG/100ML; MG/100ML
INJECTION, SOLUTION INTRAVENOUS CONTINUOUS PRN
Status: DISCONTINUED | OUTPATIENT
Start: 2022-05-02 | End: 2022-05-02

## 2022-05-02 RX ORDER — OXYCODONE HYDROCHLORIDE 5 MG/1
5 TABLET ORAL
Status: DISCONTINUED | OUTPATIENT
Start: 2022-05-02 | End: 2022-05-02 | Stop reason: HOSPADM

## 2022-05-02 RX ADMIN — PHENYLEPHRINE HYDROCHLORIDE 200 MCG: 10 INJECTION INTRAVENOUS at 09:05

## 2022-05-02 RX ADMIN — FENTANYL CITRATE 25 MCG: 50 INJECTION, SOLUTION INTRAMUSCULAR; INTRAVENOUS at 10:05

## 2022-05-02 RX ADMIN — ACETAMINOPHEN 1000 MG: 10 INJECTION, SOLUTION INTRAVENOUS at 08:05

## 2022-05-02 RX ADMIN — LIDOCAINE HYDROCHLORIDE 80 MG: 20 INJECTION, SOLUTION EPIDURAL; INFILTRATION; INTRACAUDAL; PERINEURAL at 08:05

## 2022-05-02 RX ADMIN — FAMOTIDINE 20 MG: 10 INJECTION, SOLUTION INTRAVENOUS at 08:05

## 2022-05-02 RX ADMIN — ROCURONIUM BROMIDE 25 MG: 10 INJECTION, SOLUTION INTRAVENOUS at 09:05

## 2022-05-02 RX ADMIN — MIDAZOLAM HYDROCHLORIDE 2 MG: 1 INJECTION, SOLUTION INTRAMUSCULAR; INTRAVENOUS at 08:05

## 2022-05-02 RX ADMIN — ONDANSETRON 4 MG: 2 INJECTION INTRAMUSCULAR; INTRAVENOUS at 08:05

## 2022-05-02 RX ADMIN — LIDOCAINE HYDROCHLORIDE 4 ML: 40 SOLUTION TOPICAL at 08:05

## 2022-05-02 RX ADMIN — FENTANYL CITRATE 50 MCG: 50 INJECTION INTRAMUSCULAR; INTRAVENOUS at 08:05

## 2022-05-02 RX ADMIN — SODIUM CHLORIDE, SODIUM LACTATE, POTASSIUM CHLORIDE, AND CALCIUM CHLORIDE: .6; .31; .03; .02 INJECTION, SOLUTION INTRAVENOUS at 08:05

## 2022-05-02 RX ADMIN — ROCURONIUM BROMIDE 5 MG: 10 INJECTION, SOLUTION INTRAVENOUS at 08:05

## 2022-05-02 RX ADMIN — ONDANSETRON 4 MG: 2 INJECTION INTRAMUSCULAR; INTRAVENOUS at 11:05

## 2022-05-02 RX ADMIN — DIPHENHYDRAMINE HYDROCHLORIDE 6.25 MG: 50 INJECTION, SOLUTION INTRAMUSCULAR; INTRAVENOUS at 08:05

## 2022-05-02 RX ADMIN — CEFOXITIN SODIUM 2 G: 2 INJECTION, SOLUTION INTRAVENOUS at 08:05

## 2022-05-02 RX ADMIN — SODIUM CHLORIDE, SODIUM LACTATE, POTASSIUM CHLORIDE, AND CALCIUM CHLORIDE: .6; .31; .03; .02 INJECTION, SOLUTION INTRAVENOUS at 09:05

## 2022-05-02 RX ADMIN — OXYCODONE HYDROCHLORIDE 5 MG: 5 TABLET ORAL at 10:05

## 2022-05-02 RX ADMIN — PROPOFOL 130 MG: 10 INJECTION, EMULSION INTRAVENOUS at 08:05

## 2022-05-02 RX ADMIN — SCOPOLAMINE 1 PATCH: 1 PATCH TRANSDERMAL at 08:05

## 2022-05-02 RX ADMIN — Medication 120 MG: at 08:05

## 2022-05-02 RX ADMIN — LIDOCAINE HYDROCHLORIDE 5 ML: 20 JELLY TOPICAL at 08:05

## 2022-05-02 RX ADMIN — SUGAMMADEX 200 MG: 100 INJECTION, SOLUTION INTRAVENOUS at 09:05

## 2022-05-02 NOTE — OP NOTE
Surgery Date: 5/2/2022     Surgeon(s) and Role:     * Vasiliy Yin III, MD - Primary    Assisting Surgeon: None    Pre-op Diagnosis:  Chronic calculous cholecystitis [K80.10]    Post-op Diagnosis:  Post-Op Diagnosis Codes:     * Chronic calculous cholecystitis [K80.10]    Procedure(s) (LRB):  CHOLECYSTECTOMY, LAPAROSCOPIC (N/A)    Anesthesia: General    Operative Findings: The patient was brought to the operating room and transferred to the operating room table in the supine position.  Patient was given general anesthesia and intubated.  The abdomen was prepped and draped.  A transverse infraumbilical incision was made.  Dissection was carried down through the skin and subcutaneous tissue.  The abdomen was insufflated with the Veress needle. The abdomen was entered with the 5 mm visiport. Under direct visualization, three ports were placed.  One 12 mm was placed in the subxiphoid position, one 5 mm in the right anterior axillary line and the other 5 mm in the mid clavicular line.  The gallbladder body was retracted superiorly and the infundibulum was retracted laterally.  The peritoneum overlying the cystic duct and artery was carefully taken down.  The cystic duct and cystic artery were individually isolated and dissected free 360 degrees.  They were individually clipped proximally and distally.  They were transected using endo miryam.  Electrocautery was used to take the gallbladder off the liver bed.  The Endo-Catch bag was used to remove the gallbladder from the abdomen.  We irrigated out the right upper quadrant with irrigation.  We checked again and there was no evidence of any bile leak or bleeding from our clips or from the liver bed.  The patient tolerated the procedure well.  We removed all of our ports. We repaired the skin with 4-0 Monocryl.  After that was done, the patient was extubated and taken to the recovery room in stable condition.  All lap and instrument counts were correct.    Estimated  Blood Loss: 10 mL    Estimated Blood Loss has been documented.       complications none   Specimens:   Specimen (24h ago, onward)             Start     Ordered    05/02/22 0923  Specimen to Pathology - Surgery  Once        Comments: Pre-op Diagnosis: Chronic calculous cholecystitis [K80.10]Procedure(s):CHOLECYSTECTOMY, LAPAROSCOPIC Number of specimens: 1Name of specimens: 1. GALLBLADDER     Question:  Release to patient  Answer:  Immediate    05/02/22 0923                [id number]

## 2022-05-02 NOTE — ANESTHESIA POSTPROCEDURE EVALUATION
Anesthesia Post Evaluation    Patient: Papito Alberto    Procedure(s) Performed: Procedure(s) (LRB):  CHOLECYSTECTOMY, LAPAROSCOPIC (N/A)    Final Anesthesia Type: general      Patient location during evaluation: PACU  Patient participation: Yes- Able to Participate  Level of consciousness: awake and alert, oriented and awake  Post-procedure vital signs: reviewed and stable  Pain management: adequate  Airway patency: patent    PONV status at discharge: No PONV  Anesthetic complications: no      Cardiovascular status: blood pressure returned to baseline, hemodynamically stable and stable  Respiratory status: unassisted, spontaneous ventilation and room air  Hydration status: euvolemic  Follow-up not needed.          Vitals Value Taken Time   /69 05/02/22 1045   Temp 36.6 °C (97.8 °F) 05/02/22 1030   Pulse 75 05/02/22 1044   Resp 16 05/02/22 1044   SpO2 95 % 05/02/22 1044   Vitals shown include unvalidated device data.      No case tracking events are documented in the log.      Pain/Renata Score: Pain Rating Prior to Med Admin: 5 (5/2/2022 10:29 AM)  Pain Rating Post Med Admin: 2 (5/2/2022 10:29 AM)  Renata Score: 10 (5/2/2022 10:30 AM)

## 2022-05-02 NOTE — BRIEF OP NOTE
[facility]  Brief Operative Note    SUMMARY     Surgery Date: 5/2/2022     Surgeon(s) and Role:     * Vasiliy Yin III, MD - Primary    Assisting Surgeon: None    Pre-op Diagnosis:  Chronic calculous cholecystitis [K80.10]    Post-op Diagnosis:  Post-Op Diagnosis Codes:     * Chronic calculous cholecystitis [K80.10]    Procedure(s) (LRB):  CHOLECYSTECTOMY, LAPAROSCOPIC (N/A)    Anesthesia: General    Operative Findings: The patient was brought to the operating room and transferred to the operating room table in the supine position.  Patient was given general anesthesia and intubated.  The abdomen was prepped and draped.  A transverse infraumbilical incision was made.  Dissection was carried down through the skin and subcutaneous tissue.  The abdomen was insufflated with the Veress needle. The abdomen was entered with the 5 mm visiport. Under direct visualization, three ports were placed.  One 12 mm was placed in the subxiphoid position, one 5 mm in the right anterior axillary line and the other 5 mm in the mid clavicular line.  The gallbladder body was retracted superiorly and the infundibulum was retracted laterally.  The peritoneum overlying the cystic duct and artery was carefully taken down.  The cystic duct and cystic artery were individually isolated and dissected free 360 degrees.  They were individually clipped proximally and distally.  They were transected using endo miryam.  Electrocautery was used to take the gallbladder off the liver bed.  The Endo-Catch bag was used to remove the gallbladder from the abdomen.  We irrigated out the right upper quadrant with irrigation.  We checked again and there was no evidence of any bile leak or bleeding from our clips or from the liver bed.  The patient tolerated the procedure well.  We removed all of our ports. We repaired the skin with 4-0 Monocryl.  After that was done, the patient was extubated and taken to the recovery room in stable  condition.  All lap and instrument counts were correct.    Estimated Blood Loss: 10 mL    Estimated Blood Loss has been documented.       complications none   Specimens:   Specimen (24h ago, onward)             Start     Ordered    05/02/22 0923  Specimen to Pathology - Surgery  Once        Comments: Pre-op Diagnosis: Chronic calculous cholecystitis [K80.10]Procedure(s):CHOLECYSTECTOMY, LAPAROSCOPIC Number of specimens: 1Name of specimens: 1. GALLBLADDER     Question:  Release to patient  Answer:  Immediate    05/02/22 0923                [id number]

## 2022-05-02 NOTE — DISCHARGE SUMMARY
Discharge Summary  General Surgery      Admit Date: 5/2/2022    Discharge Date :5/2/2022    Attending Physician: Vasiliy Yin III     Discharge Physician: Vasiliy Yin III    Discharged Condition: good    Discharge Diagnosis: Chronic calculous cholecystitis [K80.10]    Treatments/Procedures: Procedure(s) (LRB):  CHOLECYSTECTOMY, LAPAROSCOPIC (N/A)    Hospital Course: Uneventful; Discharged home from Recovery    Significant Diagnostic Studies: none    Disposition: Home or Self Care    Diet: Regular    Follow up: Office 10-14 days    Activity: No heavy lifting till seen in office.    Patient Instructions:   Current Discharge Medication List      START taking these medications    Details   HYDROcodone-acetaminophen (NORCO) 5-325 mg per tablet Take 1 tablet by mouth every 6 (six) hours as needed.  Qty: 30 tablet, Refills: 0    Comments: Quantity prescribed more than 7 day supply? No         CONTINUE these medications which have NOT CHANGED    Details   amLODIPine (NORVASC) 10 MG tablet Take 10 mg by mouth once daily.      lisinopriL-hydrochlorothiazide (PRINZIDE,ZESTORETIC) 20-12.5 mg per tablet Take 1 tablet by mouth once daily.      metFORMIN (GLUCOPHAGE) 1000 MG tablet Take 1,000 mg by mouth 2 (two) times daily.      multivitamin capsule Take 1 capsule by mouth once daily.      rosuvastatin (CRESTOR) 40 MG Tab Take 40 mg by mouth once daily.             Discharge Procedure Orders   Diet Adult Regular     Lifting restrictions   Order Comments: No lifting over twenty pounds for six weeks     Remove dressing in 48 hours

## 2022-05-02 NOTE — ANESTHESIA PREPROCEDURE EVALUATION
2022  Papito Alberto is a 56 y.o., female.      Patient Active Problem List   Diagnosis    Nonspecific abnormal electrocardiogram (ECG) (EKG)    History of cholecystitis    Essential hypertension    Dyslipidemia    Type 2 diabetes mellitus without complication, without long-term current use of insulin       Past Surgical History:   Procedure Laterality Date     SECTION  1984    FUSION OF FACETS OF LUMBAR SPINE USING MINIMALLY INVASIVE TECHNIQUE      PARTIAL HYSTERECTOMY          Tobacco Use:  The patient  reports that she has never smoked. She has never used smokeless tobacco.     Results for orders placed or performed in visit on 22   IN OFFICE EKG 12-LEAD (to [facility])    Collection Time: 22  5:06 PM    Narrative    Test Reason : R94.31,I10,E78.5,E11.9,    Vent. Rate : 087 BPM     Atrial Rate : 087 BPM     P-R Int : 190 ms          QRS Dur : 072 ms      QT Int : 386 ms       P-R-T Axes : 042 -03 011 degrees     QTc Int : 464 ms    Normal sinus rhythm  Possible Left atrial enlargement  Septal infarct (cited on or before 2022)  Possible Lateral infarct ,age undetermined  Abnormal ECG  When compared with ECG of 2022 09:08,  Nonspecific T wave abnormality now evident in Inferior leads  Confirmed by Butch Sharp MD ([id number]) on 3/14/2022 12:23:44 PM    Referred By: NIELS WALTON           Confirmed By:Butch Sharp MD             Lab Results   Component Value Date    WBC 6.12 2022    HGB 12.9 2022    HCT 39.1 2022    MCV 87 2022     2022     BMP  Lab Results   Component Value Date     2022    K 4.0 2022    CL 98 2022    CO2 26 2022    BUN 11 2022    CREATININE 1.1 2022    CALCIUM 9.7 2022    ANIONGAP 17 (H) 2022     (H) 2022    GLU  132 (H) 01/28/2022       Results for orders placed in visit on 03/16/22    Echo    Interpretation Summary  · The left ventricle is normal in size with concentric remodeling and normal systolic function.  · The estimated ejection fraction is 70%.  · Normal left ventricular diastolic function.  · Normal right ventricular size with normal right ventricular systolic function.  · Mild mitral regurgitation.  · Normal central venous pressure (3 mmHg).  · The estimated PA systolic pressure is 20 mmHg.        Pre-op Assessment    I have reviewed the Patient Summary Reports.     I have reviewed the Nursing Notes. I have reviewed the NPO Status.   I have reviewed the Medications.     Review of Systems  Anesthesia Hx:  No problems with previous Anesthesia  Denies Family Hx of Anesthesia complications.   Denies Personal Hx of Anesthesia complications.   Social:  Alcohol Use, Non-Smoker    Cardiovascular:   Hypertension, well controlled hyperlipidemia ECG has been reviewed. Patient followed by Dr. GIBSON Sharp seen all in 1 month ago  Cardiac stress test at workup negative per patient   Pulmonary:   Asthma asymptomatic Sleep Apnea    Education provided regarding risk of obstructive sleep apnea     Musculoskeletal:   Patient without residual deficit  Previously followed by neurologist  No neurologist at this time Spine Disorders: lumbar Disc disease, Degenerative disease and Chronic Pain    Neurological:   TIA,   Peripheral Neuropathy    Endocrine:   Diabetes, poorly controlled, type 2    Dermatological:  Skin Normal    Psych:  Psychiatric Normal           Physical Exam  General: Well nourished, Cooperative, Alert and Oriented    Airway:  Mallampati: III   Mouth Opening: Normal  TM Distance: Normal  Tongue: Normal  Neck ROM: Normal ROM    Dental:    Chest/Lungs:  Clear to auscultation, Normal Respiratory Rate    Heart:  Rate: Normal  Rhythm: Regular Rhythm  Sounds: Normal    Abdomen:  Normal, Soft, Nontender        Anesthesia  Plan  Type of Anesthesia, risks & benefits discussed:    Anesthesia Type: Gen ETT  Intra-op Monitoring Plan: Standard ASA Monitors  Post Op Pain Control Plan: multimodal analgesia and IV/PO Opioids PRN  Induction:  IV  Airway Plan: Direct and Video, Post-Induction  Informed Consent: Informed consent signed with the Patient and all parties understand the risks and agree with anesthesia plan.  All questions answered.   ASA Score: 3  Anesthesia Plan Notes:     GETA  Benadryl 6.25 mg iv, Zofran 4 mg iv, Pepcid 20 mg iv  Ofirmev 1000 mg iv, Scopolamine Patch, Sugammadex   CHRISTAL Precautions: Extubate Patient Awake with HOB Elevated and Oral Airway in Place     Ready For Surgery From Anesthesia Perspective.     .

## 2022-05-02 NOTE — H&P
Patient ID: Papito Alberto is a 56 y.o. female.     Chief Complaint: Consult (Gallbladder )        HPI:update to H and P. No change in H and P.   56 year old female referred to the office with biliary symptoms and gallstones on imaging. She first noticed RUQ and epigastric abdominal pain seven months ago. Pain worse with meals and also associated with nausea. US shows gallstones with largest measuring 13 mm. She states symptoms are daily. She has no fever of chills. She has past surgical history of .      No past medical history on file.  No past surgical history on file.  Review of patient's allergies indicates:  No Known Allergies       Medication List with Changes/Refills   Current Medications     AMLODIPINE (NORVASC) 10 MG TABLET    amlodipine 10 mg tablet   TAKE 1 TABLET BY MOUTH ONCE DAILY     LISINOPRIL-HYDROCHLOROTHIAZIDE (PRINZIDE,ZESTORETIC) 20-12.5 MG PER TABLET    lisinopril 20 mg-hydrochlorothiazide 12.5 mg tablet   TAKE 1 TABLET BY MOUTH ONCE DAILY     METFORMIN (GLUCOPHAGE) 1000 MG TABLET    Take 1,000 mg by mouth 2 (two) times daily.     ROSUVASTATIN (CRESTOR) 40 MG TAB    Take 40 mg by mouth.      No family history on file.  Social History           Socioeconomic History    Marital status:             Review of Systems   Constitutional: Negative for appetite change, chills, fever and unexpected weight change.   HENT: Negative for hearing loss, rhinorrhea, sore throat and voice change.    Eyes: Negative for photophobia and visual disturbance.   Respiratory: Negative for cough, choking and shortness of breath.    Cardiovascular: Negative for chest pain, palpitations and leg swelling.   Gastrointestinal: Positive for abdominal pain and nausea. Negative for blood in stool, constipation, diarrhea and vomiting.   Endocrine: Negative for cold intolerance, heat intolerance, polydipsia and polyuria.   Musculoskeletal: Negative for arthralgias, back pain, joint swelling and neck stiffness.    Skin: Negative for color change, pallor and rash.   Neurological: Negative for dizziness, seizures, syncope and headaches.   Hematological: Negative for adenopathy. Does not bruise/bleed easily.   Psychiatric/Behavioral: Negative for agitation, behavioral problems and confusion.       Objective:   Physical Exam  Constitutional:       General: She is not in acute distress.     Appearance: Normal appearance. She is well-developed and well-nourished. She is not toxic-appearing.   HENT:      Head: Normocephalic and atraumatic. No abrasion or laceration.      Right Ear: External ear normal.      Left Ear: External ear normal.      Nose: Nose normal.      Mouth/Throat:      Mouth: Oropharynx is clear and moist.   Eyes:      Extraocular Movements: EOM normal.      Pupils: Pupils are equal, round, and reactive to light.   Neck:      Trachea: Trachea and phonation normal. No tracheal deviation.   Cardiovascular:      Rate and Rhythm: Normal rate and regular rhythm.   Pulmonary:      Effort: Pulmonary effort is normal. No tachypnea, accessory muscle usage or respiratory distress.   Abdominal:      General: There is no distension.      Palpations: Abdomen is soft. There is no hepatosplenomegaly or mass.      Tenderness: There is no abdominal tenderness. There is no guarding or rebound.   Musculoskeletal:      Cervical back: Neck supple. Normal range of motion.   Lymphadenopathy:      Cervical: No cervical adenopathy.      Upper Body:   No axillary adenopathy present.     Lower Body: No right inguinal adenopathy. No left inguinal adenopathy.   Skin:     General: Skin is warm and intact.   Neurological:      Mental Status: She is alert and oriented to person, place, and time.      Coordination: Coordination normal.      Gait: Gait normal.   Psychiatric:         Mood and Affect: Mood and affect normal.         Speech: Speech normal.         Behavior: Behavior normal.          Assessment/Plan:   Papito was seen today for  consult.     Diagnoses and all orders for this visit:     Chronic calculous cholecystitis  -     Full code; Standing  -     Insert peripheral IV; Standing  -     Case Request Operating Room: CHOLECYSTECTOMY, LAPAROSCOPIC  -     Comprehensive metabolic panel; Future  -     CBC auto differential; Future  -     EKG 12-lead; Future  -     X-Ray Chest PA And Lateral; Future  -     Full code  -     Insert peripheral IV     Other orders  -     ceFOXItin (MEFOXIN) 2 g in dextrose 5 % 50 mL IVPB           Patient has biliary symptoms with gallstones on imaging.  Will plan on cholecystectomy. Risks and benefits of surgery discussed with the patient.        I discussed the proposed procedures with the patient including risks, benefits, indications, alternatives and special concerns.  The patient appears to understand and agrees to go ahead with surgery.  I have made no promises, warranties or verbal agreements beyond what was discussed above.

## 2022-05-02 NOTE — ANESTHESIA PROCEDURE NOTES
Intubation    Date/Time: 5/2/2022 8:43 AM  Performed by: Chauncey Rangel CRNA  Authorized by: Gopal Mayo MD     Intubation:     Induction:  Intravenous    Intubated:  Postinduction    Mask Ventilation:  Easy mask    Attempts:  1    Attempted By:  CRNA    Method of Intubation:  Direct    Blade:  Spicer 2    Laryngeal View Grade: Grade I - full view of cords      Difficult Airway Encountered?: No      Complications:  None    Airway Device:  Oral endotracheal tube    Airway Device Size:  7.0    Style/Cuff Inflation:  Cuffed    Tube secured:  21    Secured at:  The lips    Placement Verified By:  Capnometry    Complicating Factors:  None    Findings Post-Intubation:  BS equal bilateral and atraumatic/condition of teeth unchanged

## 2022-05-02 NOTE — TRANSFER OF CARE
"Anesthesia Transfer of Care Note    Patient: Papito Alberto    Procedure(s) Performed: Procedure(s) (LRB):  CHOLECYSTECTOMY, LAPAROSCOPIC (N/A)    Patient location: PACU    Anesthesia Type: general    Transport from OR: Transported from OR on room air with adequate spontaneous ventilation    Post pain: adequate analgesia    Post assessment: no apparent anesthetic complications    Post vital signs: stable    Level of consciousness: awake and alert    Nausea/Vomiting: no nausea/vomiting    Complications: none    Transfer of care protocol was followed      Last vitals:   Visit Vitals  /80 (BP Location: Right arm, Patient Position: Sitting)   Pulse 88   Temp 36.9 °C (98.5 °F) (Oral)   Resp 16   Ht 5' 5" (1.651 m)   Wt 100.2 kg (220 lb 14.4 oz)   SpO2 97%   Breastfeeding No   BMI 36.76 kg/m²     "

## 2022-05-17 ENCOUNTER — OFFICE VISIT (OUTPATIENT)
Dept: SURGERY | Facility: CLINIC | Age: 57
End: 2022-05-17
Payer: MEDICARE

## 2022-05-17 VITALS
DIASTOLIC BLOOD PRESSURE: 78 MMHG | HEART RATE: 86 BPM | HEIGHT: 65 IN | WEIGHT: 220 LBS | TEMPERATURE: 98 F | BODY MASS INDEX: 36.65 KG/M2 | SYSTOLIC BLOOD PRESSURE: 130 MMHG

## 2022-05-17 DIAGNOSIS — K80.10 CHRONIC CALCULOUS CHOLECYSTITIS: Primary | ICD-10-CM

## 2022-05-17 PROCEDURE — 99024 PR POST-OP FOLLOW-UP VISIT: ICD-10-PCS | Mod: S$GLB,POP,, | Performed by: SURGERY

## 2022-05-17 PROCEDURE — 99024 POSTOP FOLLOW-UP VISIT: CPT | Mod: S$GLB,POP,, | Performed by: SURGERY

## 2022-05-18 NOTE — PROGRESS NOTES
Subjective:       Patient ID: Papito Alberto is a 56 y.o. female.    Chief Complaint: Post-op Evaluation      HPI:  Status post cholecystectomy.  Doing well.  Feeling well.  Path benign.    Review of Systems    Objective:      Physical Exam  Constitutional:       General: She is not in acute distress.  Pulmonary:      Effort: Pulmonary effort is normal. No respiratory distress.   Abdominal:      General: There is no distension.      Palpations: Abdomen is soft.      Tenderness: There is no abdominal tenderness. There is no guarding or rebound.   Skin:     Comments: Incisions are clean, dry and intact  There is no evidence of infection, hematoma or seroma    Neurological:      Mental Status: She is alert and oriented to person, place, and time.   Psychiatric:         Behavior: Behavior is cooperative.         Assessment/Plan:   Chronic calculous cholecystitis      Status post cholecystectomy.  Doing well.  Path benign.  Return to clinic p.r.n..

## 2022-11-07 ENCOUNTER — TELEPHONE (OUTPATIENT)
Dept: SURGERY | Facility: CLINIC | Age: 57
End: 2022-11-07
Payer: MEDICARE

## 2022-11-07 DIAGNOSIS — Z12.31 ENCOUNTER FOR SCREENING MAMMOGRAM FOR MALIGNANT NEOPLASM OF BREAST: Primary | ICD-10-CM

## 2022-11-07 DIAGNOSIS — R07.89 OTHER CHEST PAIN: Primary | ICD-10-CM

## 2022-11-11 ENCOUNTER — HOSPITAL ENCOUNTER (OUTPATIENT)
Dept: RADIOLOGY | Facility: HOSPITAL | Age: 57
Discharge: HOME | End: 2022-11-11
Payer: MEDICARE

## 2022-11-11 VITALS — HEIGHT: 65 IN | WEIGHT: 220 LBS | BODY MASS INDEX: 36.65 KG/M2

## 2022-11-11 DIAGNOSIS — Z12.31 ENCOUNTER FOR SCREENING MAMMOGRAM FOR MALIGNANT NEOPLASM OF BREAST: ICD-10-CM

## 2022-11-11 PROCEDURE — 77067 SCR MAMMO BI INCL CAD: CPT | Mod: TC,PO

## 2022-11-11 PROCEDURE — 77063 BREAST TOMOSYNTHESIS BI: CPT | Mod: TC,PO

## 2022-11-18 ENCOUNTER — TELEPHONE (OUTPATIENT)
Dept: OBSTETRICS AND GYNECOLOGY | Facility: CLINIC | Age: 57
End: 2022-11-18
Payer: MEDICARE

## 2022-12-06 ENCOUNTER — PATIENT MESSAGE (OUTPATIENT)
Dept: CARDIOLOGY | Facility: CLINIC | Age: 57
End: 2022-12-06
Payer: MEDICARE

## 2022-12-06 ENCOUNTER — TELEPHONE (OUTPATIENT)
Dept: CARDIOLOGY | Facility: CLINIC | Age: 57
End: 2022-12-06
Payer: MEDICARE

## 2022-12-22 ENCOUNTER — PATIENT MESSAGE (OUTPATIENT)
Dept: CARDIOLOGY | Facility: CLINIC | Age: 57
End: 2022-12-22
Payer: MEDICARE

## 2023-01-31 ENCOUNTER — OFFICE VISIT (OUTPATIENT)
Dept: CARDIOLOGY | Facility: CLINIC | Age: 58
End: 2023-01-31
Payer: MEDICARE

## 2023-01-31 VITALS
SYSTOLIC BLOOD PRESSURE: 110 MMHG | WEIGHT: 218.25 LBS | HEART RATE: 102 BPM | BODY MASS INDEX: 36.36 KG/M2 | DIASTOLIC BLOOD PRESSURE: 88 MMHG | HEIGHT: 65 IN

## 2023-01-31 DIAGNOSIS — R07.9 CHEST PAIN, UNSPECIFIED TYPE: Primary | ICD-10-CM

## 2023-01-31 PROCEDURE — 99203 OFFICE O/P NEW LOW 30 MIN: CPT | Mod: S$PBB,,, | Performed by: INTERNAL MEDICINE

## 2023-01-31 PROCEDURE — 99999 PR PBB SHADOW E&M-EST. PATIENT-LVL III: ICD-10-PCS | Mod: PBBFAC,,, | Performed by: INTERNAL MEDICINE

## 2023-01-31 PROCEDURE — 99213 OFFICE O/P EST LOW 20 MIN: CPT | Mod: PBBFAC,PN | Performed by: INTERNAL MEDICINE

## 2023-01-31 PROCEDURE — 93005 ELECTROCARDIOGRAM TRACING: CPT | Mod: PBBFAC,PN | Performed by: INTERNAL MEDICINE

## 2023-01-31 PROCEDURE — 93010 EKG 12-LEAD: ICD-10-PCS | Mod: S$PBB,,, | Performed by: INTERNAL MEDICINE

## 2023-01-31 PROCEDURE — 99203 PR OFFICE/OUTPT VISIT, NEW, LEVL III, 30-44 MIN: ICD-10-PCS | Mod: S$PBB,,, | Performed by: INTERNAL MEDICINE

## 2023-01-31 PROCEDURE — 99999 PR PBB SHADOW E&M-EST. PATIENT-LVL III: CPT | Mod: PBBFAC,,, | Performed by: INTERNAL MEDICINE

## 2023-01-31 PROCEDURE — 93010 ELECTROCARDIOGRAM REPORT: CPT | Mod: S$PBB,,, | Performed by: INTERNAL MEDICINE

## 2023-01-31 RX ORDER — ASPIRIN 81 MG/1
81 TABLET ORAL DAILY
Qty: 90 TABLET | Refills: 1 | Status: SHIPPED | OUTPATIENT
Start: 2023-01-31

## 2023-01-31 NOTE — PROGRESS NOTES
Subjective:    Patient ID:  Papito Alberto is a 57 y.o. female patient here for evaluation Hypertension      History of Present Illness:  57-old female with past medical history of hypertension, dyslipidemia, diabetes, TIA came here for initial evaluation.  Patient complains of 3 week history of intermittent chest tightness with exertion, which last few minutes and relieves with rest.  Patient states that the symptoms does not occur all the time with exertion but happens intermittently.  Symptoms are stable and not getting worse. Denies dyspnea, palpitations, dizziness.  Patient had a negative stress echo last year which was performed prior to her surgery.  Patient is compliant with medications.  Patient is asymptomatic today.          Review of patient's allergies indicates:  No Known Allergies    Past Medical History:   Diagnosis Date    Diabetes mellitus 2019    Hyperlipidemia 2017    Hypertension 2013    TIA (transient ischemic attack) 2013    x2 2013 2016     Past Surgical History:   Procedure Laterality Date     SECTION  [id number]    FUSION OF FACETS OF LUMBAR SPINE USING MINIMALLY INVASIVE TECHNIQUE  2006    HYSTERECTOMY      LAPAROSCOPIC CHOLECYSTECTOMY N/A 2022    Procedure: CHOLECYSTECTOMY, LAPAROSCOPIC;  Surgeon: Vasiliy Yin III, MD;  Location: [facility];  Service: General;  Laterality: N/A;    PARTIAL HYSTERECTOMY       Social History     Tobacco Use    Smoking status: Never    Smokeless tobacco: Never   Substance Use Topics    Alcohol use: Yes     Comment: occassionally    Drug use: Never        Review of Systems   Negative except as mentioned in HPI         Objective        Vitals:    23 1439   BP: 110/88   Pulse: 102       LIPIDS - LAST 2   No results found for: CHOL, HDL, LDLCALC, TRIG, CHOLHDL    CBC - LAST 2  Lab Results   Component Value Date    WBC 6.12 2022    WBC 5.83 2022    RBC 4.48 2022    RBC 4.52 2022    HGB 12.9  04/29/2022    HGB 13.2 01/28/2022    HCT 39.1 04/29/2022    HCT 40.3 01/28/2022    MCV 87 04/29/2022    MCV 89 01/28/2022    MCH 28.8 04/29/2022    MCH 29.2 01/28/2022    MCHC 33.0 04/29/2022    MCHC 32.8 01/28/2022    RDW 14.6 (H) 04/29/2022    RDW 14.7 (H) 01/28/2022     04/29/2022     01/28/2022    MPV 8.5 (L) 04/29/2022    MPV 8.9 (L) 01/28/2022    GRAN 3.0 04/29/2022    GRAN 48.4 04/29/2022    LYMPH 2.5 04/29/2022    LYMPH 40.7 04/29/2022    MONO 0.5 04/29/2022    MONO 8.3 04/29/2022    BASO 0.03 04/29/2022    BASO 0.04 01/28/2022    NRBC 0 04/29/2022    NRBC 0 01/28/2022       CHEMISTRY & LIVER FUNCTION - LAST 2  Lab Results   Component Value Date     04/29/2022     01/28/2022    K 4.0 04/29/2022    K 3.6 01/28/2022    CL 98 04/29/2022    CL 99 01/28/2022    CO2 26 04/29/2022    CO2 28 01/28/2022    ANIONGAP 17 (H) 04/29/2022    ANIONGAP 11 01/28/2022    BUN 11 04/29/2022    BUN 10 01/28/2022    CREATININE 1.1 04/29/2022    CREATININE 1.1 01/28/2022     (H) 04/29/2022     (H) 01/28/2022    CALCIUM 9.7 04/29/2022    CALCIUM 9.5 01/28/2022    ALBUMIN 4.5 04/29/2022    ALBUMIN 4.7 01/28/2022    PROT 8.5 (H) 04/29/2022    PROT 8.6 (H) 01/28/2022    ALKPHOS 61 04/29/2022    ALKPHOS 69 01/28/2022    ALT 21 04/29/2022    ALT 18 01/28/2022    AST 25 04/29/2022    AST 25 01/28/2022    BILITOT 0.5 04/29/2022    BILITOT 0.6 01/28/2022        CARDIAC PROFILE - LAST 2  No results found for: BNP, CPK, CPKMB, LDH, TROPONINI, TROPONINIHS     COAGULATION - LAST 2  No results found for: LABPT, INR, APTT    ENDOCRINE & PSA - LAST 2  No results found for: HGBA1C, MICROALBUR, TSH, PROCAL, PSA     ECHOCARDIOGRAM RESULTS  Results for orders placed in visit on 03/16/22    Echo    Interpretation Summary  · The left ventricle is normal in size with concentric remodeling and normal systolic function.  · The estimated ejection fraction is 70%.  · Normal left ventricular diastolic function.  ·  Normal right ventricular size with normal right ventricular systolic function.  · Mild mitral regurgitation.  · Normal central venous pressure (3 mmHg).  · The estimated PA systolic pressure is 20 mmHg.      CURRENT/PREVIOUS VISIT EKG  Results for orders placed or performed in visit on 02/21/22   IN OFFICE EKG 12-LEAD (to [place])    Collection Time: 02/21/22  5:06 PM    Narrative    Test Reason : R94.31,I10,E78.5,E11.9,    Vent. Rate : 087 BPM     Atrial Rate : 087 BPM     P-R Int : 190 ms          QRS Dur : 072 ms      QT Int : 386 ms       P-R-T Axes : 042 -03 011 degrees     QTc Int : 464 ms    Normal sinus rhythm  Possible Left atrial enlargement  Septal infarct (cited on or before 31-JAN-2022)  Possible Lateral infarct ,age undetermined  Abnormal ECG  When compared with ECG of 31-JAN-2022 09:08,  Nonspecific T wave abnormality now evident in Inferior leads  Confirmed by Butch Sharp MD ([id number]) on 3/14/2022 12:23:44 PM    Referred By: NIELS WALTON           Confirmed By:Butch Sharp MD     No valid procedures specified.   No results found for this or any previous visit.    No valid procedures specified.          PREVIOUS STRESS TEST              PREVIOUS ANGIOGRAM        PHYSICAL EXAM    CONSTITUTIONAL: Well built, well nourished in no apparent distress  HEENT: No pallor  NECK: no JVD, no bruit  LUNGS: CTA b/l  HEART: regular rate and rhythm, S1, S2 normal, no murmur   ABDOMEN: soft, non-tender; bowel sounds normal  EXTREMITIES: No edema  NEURO: AAO X 3   SKIN:  No rash  Psych:  Normal affect    I HAVE REVIEWED :    The vital signs, nurses notes, and all the pertinent radiology and labs.        Current Outpatient Medications   Medication Instructions    amLODIPine (NORVASC) 10 mg, Oral, Daily    HYDROcodone-acetaminophen (NORCO) 5-325 mg per tablet 1 tablet, Oral, Every 6 hours PRN    lisinopriL-hydrochlorothiazide (PRINZIDE,ZESTORETIC) 20-12.5 mg per tablet 1 tablet, Oral, Daily    metFORMIN (GLUCOPHAGE)  1,000 mg, Oral, 2 times daily    multivitamin capsule 1 capsule, Oral, Daily    rosuvastatin (CRESTOR) 40 mg, Oral, Daily        ECG reviewed by me:  Sinus rhythm, Pac with aberrancy  Assessment & Plan     57-old female with past medical history of hypertension, dyslipidemia, diabetes, TIA came here for initial evaluation.  Intermittent chest pain with exertion- stable:  Asymptomatic today  -2D echocardiogram to evaluate LV function  -exercise nuclear stress test for evaluation of ischemia  -start aspirin 81 mg in view of history of TIA  -check lipid profile  -continue current medications  -low-sodium, low-fat diet  -follow up after test results  -Advised patient go to the emergency room in case of worsening symptoms          No follow-ups on file.

## 2023-02-24 ENCOUNTER — TELEPHONE (OUTPATIENT)
Dept: CARDIOLOGY | Facility: HOSPITAL | Age: 58
End: 2023-02-24

## 2023-02-24 NOTE — TELEPHONE ENCOUNTER
Left message on voicemail.     Patient advised, test will be at [facility] ([address]   Will need to register on the first floor at the main entrance.   Patient advised that arrival time is 6:40am.  Patient advised that she may be here about 3.5-4 hours, and may want to bring something to occupy their time, as there will be periods of waiting.    Patient advised, may take her medications prior to testing if you need to.  Advised if she needs to eat to take her medications, please keep it light, like toast and juice.    Patient advised to avoid all caffeine 12 hours prior to testing.  This includes decaf tea and coffee.    Will provide peanut butter crackers for a snack after stress test.  If patient would prefer something else, please bring a snack from home.    Wear comfortable clothing.   No lotions, oils, or powders to the upper chest area. May wear deodorant.    No metal jewelry, buttons, or zippers to the upper body.  Advised to call the office if any questions.     Will send instructions via [employer] as well.

## 2023-02-27 ENCOUNTER — HOSPITAL ENCOUNTER (OUTPATIENT)
Dept: CARDIOLOGY | Facility: HOSPITAL | Age: 58
Discharge: HOME | End: 2023-02-27
Attending: INTERNAL MEDICINE
Payer: MEDICARE

## 2023-02-27 ENCOUNTER — HOSPITAL ENCOUNTER (OUTPATIENT)
Dept: RADIOLOGY | Facility: HOSPITAL | Age: 58
Discharge: HOME | End: 2023-02-27
Attending: INTERNAL MEDICINE
Payer: MEDICARE

## 2023-02-27 VITALS — HEIGHT: 65 IN | BODY MASS INDEX: 36.32 KG/M2 | WEIGHT: 218 LBS

## 2023-02-27 DIAGNOSIS — R07.9 CHEST PAIN, UNSPECIFIED TYPE: ICD-10-CM

## 2023-02-27 DIAGNOSIS — R07.9 CHEST PAIN, UNSPECIFIED TYPE: Primary | ICD-10-CM

## 2023-02-27 PROCEDURE — 93018 NUCLEAR STRESS - CARDIOLOGY INTERPRETED (CUPID ONLY): ICD-10-PCS | Mod: ,,, | Performed by: INTERNAL MEDICINE

## 2023-02-27 PROCEDURE — 93306 TTE W/DOPPLER COMPLETE: CPT

## 2023-02-27 PROCEDURE — 93306 TTE W/DOPPLER COMPLETE: CPT | Mod: 26,,, | Performed by: INTERNAL MEDICINE

## 2023-02-27 PROCEDURE — 93016 NUCLEAR STRESS - CARDIOLOGY INTERPRETED (CUPID ONLY): ICD-10-PCS | Mod: ,,, | Performed by: NURSE PRACTITIONER

## 2023-02-27 PROCEDURE — 93017 CV STRESS TEST TRACING ONLY: CPT

## 2023-02-27 PROCEDURE — A9502 TC99M TETROFOSMIN: HCPCS

## 2023-02-27 PROCEDURE — 78452 NUCLEAR STRESS - CARDIOLOGY INTERPRETED (CUPID ONLY): ICD-10-PCS | Mod: 26,,, | Performed by: INTERNAL MEDICINE

## 2023-02-27 PROCEDURE — 93306 ECHO (CUPID ONLY): ICD-10-PCS | Mod: 26,,, | Performed by: INTERNAL MEDICINE

## 2023-02-27 PROCEDURE — 93018 CV STRESS TEST I&R ONLY: CPT | Mod: ,,, | Performed by: INTERNAL MEDICINE

## 2023-02-27 PROCEDURE — 78452 HT MUSCLE IMAGE SPECT MULT: CPT | Mod: 26,,, | Performed by: INTERNAL MEDICINE

## 2023-02-27 PROCEDURE — 93016 CV STRESS TEST SUPVJ ONLY: CPT | Mod: ,,, | Performed by: NURSE PRACTITIONER

## 2023-03-16 ENCOUNTER — OFFICE VISIT (OUTPATIENT)
Dept: CARDIOLOGY | Facility: CLINIC | Age: 58
End: 2023-03-16
Payer: MEDICARE

## 2023-03-16 VITALS
SYSTOLIC BLOOD PRESSURE: 122 MMHG | HEART RATE: 72 BPM | HEIGHT: 65 IN | DIASTOLIC BLOOD PRESSURE: 70 MMHG | WEIGHT: 216.06 LBS | BODY MASS INDEX: 36 KG/M2

## 2023-03-16 DIAGNOSIS — R07.9 CHEST PAIN, UNSPECIFIED TYPE: Primary | ICD-10-CM

## 2023-03-16 LAB
AORTIC ROOT ANNULUS: 3.1 CM
AORTIC VALVE CUSP SEPERATION: 1.9 CM
AV INDEX (PROSTH): 0.96
AV MEAN GRADIENT: 2 MMHG
AV PEAK GRADIENT: 4 MMHG
AV VALVE AREA: 3.01 CM2
AV VELOCITY RATIO: 0.97
BSA FOR ECHO PROCEDURE: 2.13 M2
CV ECHO LV RWT: 0.41 CM
CV STRESS BASE HR: 69 BPM
DIASTOLIC BLOOD PRESSURE: 70 MMHG
DOP CALC AO PEAK VEL: 1.01 M/S
DOP CALC AO VTI: 21.1 CM
DOP CALC LVOT AREA: 3.1 CM2
DOP CALC LVOT DIAMETER: 2 CM
DOP CALC LVOT PEAK VEL: 0.98 M/S
DOP CALC LVOT STROKE VOLUME: 63.43 CM3
DOP CALCLVOT PEAK VEL VTI: 20.2 CM
E WAVE DECELERATION TIME: 176 MSEC
E/A RATIO: 1.8
E/E' RATIO: 11.16 M/S
ECHO LV POSTERIOR WALL: 0.85 CM (ref 0.6–1.1)
EJECTION FRACTION- HIGH: 65 %
EJECTION FRACTION: 55 %
END DIASTOLIC INDEX-HIGH: 153 ML/M2
END DIASTOLIC INDEX-LOW: 93 ML/M2
END SYSTOLIC INDEX-HIGH: 71 ML/M2
END SYSTOLIC INDEX-LOW: 31 ML/M2
FRACTIONAL SHORTENING: 38 % (ref 28–44)
INTERVENTRICULAR SEPTUM: 1.3 CM (ref 0.6–1.1)
IVRT: 100 MSEC
LEFT ATRIUM SIZE: 3.5 CM
LEFT INTERNAL DIMENSION IN SYSTOLE: 2.56 CM (ref 2.1–4)
LEFT VENTRICLE DIASTOLIC VOLUME INDEX: 36.63 ML/M2
LEFT VENTRICLE DIASTOLIC VOLUME: 75.1 ML
LEFT VENTRICLE MASS INDEX: 72 G/M2
LEFT VENTRICLE SYSTOLIC VOLUME INDEX: 11.6 ML/M2
LEFT VENTRICLE SYSTOLIC VOLUME: 23.7 ML
LEFT VENTRICULAR INTERNAL DIMENSION IN DIASTOLE: 4.12 CM (ref 3.5–6)
LEFT VENTRICULAR MASS: 147.5 G
LV LATERAL E/E' RATIO: 10.6 M/S
LV SEPTAL E/E' RATIO: 11.78 M/S
LVOT MG: 2 MMHG
LVOT MV: 0.65 CM/S
MV PEAK A VEL: 0.59 M/S
MV PEAK E VEL: 1.06 M/S
MV STENOSIS PRESSURE HALF TIME: 57 MS
MV VALVE AREA P 1/2 METHOD: 3.86 CM2
NUC REST DIASTOLIC VOLUME INDEX: 77
NUC REST EJECTION FRACTION: 66
NUC REST SYSTOLIC VOLUME INDEX: 26
NUC STRESS DIASTOLIC VOLUME INDEX: 67
NUC STRESS EJECTION FRACTION: 76 %
NUC STRESS SYSTOLIC VOLUME INDEX: 16
OHS CV CPX 1 MINUTE RECOVERY HEART RATE: 129 BPM
OHS CV CPX 85 PERCENT MAX PREDICTED HEART RATE MALE: 132
OHS CV CPX ESTIMATED METS: 7
OHS CV CPX MAX PREDICTED HEART RATE: 156
OHS CV CPX PATIENT IS FEMALE: 1
OHS CV CPX PATIENT IS MALE: 0
OHS CV CPX PEAK DIASTOLIC BLOOD PRESSURE: 84 MMHG
OHS CV CPX PEAK HEAR RATE: 146 BPM
OHS CV CPX PEAK RATE PRESSURE PRODUCT: NORMAL
OHS CV CPX PEAK SYSTOLIC BLOOD PRESSURE: 148 MMHG
OHS CV CPX PERCENT MAX PREDICTED HEART RATE ACHIEVED: 94
OHS CV CPX RATE PRESSURE PRODUCT PRESENTING: 7038
PISA TR MAX VEL: 1.88 M/S
RETIRED EF AND QEF - SEE NOTES: 53 %
RIGHT VENTRICULAR END-DIASTOLIC DIMENSION: 3.21 CM
STRESS ECHO POST EXERCISE DUR MIN: 6 MINUTES
STRESS ECHO POST EXERCISE DUR SEC: 0 SECONDS
SYSTOLIC BLOOD PRESSURE: 102 MMHG
TDI LATERAL: 0.1 M/S
TDI SEPTAL: 0.09 M/S
TDI: 0.1 M/S
TR MAX PG: 14 MMHG

## 2023-03-16 PROCEDURE — 99213 OFFICE O/P EST LOW 20 MIN: CPT | Mod: PBBFAC,PN | Performed by: INTERNAL MEDICINE

## 2023-03-16 PROCEDURE — 99999 PR PBB SHADOW E&M-EST. PATIENT-LVL III: ICD-10-PCS | Mod: PBBFAC,,, | Performed by: INTERNAL MEDICINE

## 2023-03-16 PROCEDURE — 99999 PR PBB SHADOW E&M-EST. PATIENT-LVL III: CPT | Mod: PBBFAC,,, | Performed by: INTERNAL MEDICINE

## 2023-03-16 PROCEDURE — 99213 OFFICE O/P EST LOW 20 MIN: CPT | Mod: S$PBB,,, | Performed by: INTERNAL MEDICINE

## 2023-03-16 PROCEDURE — 99213 PR OFFICE/OUTPT VISIT, EST, LEVL III, 20-29 MIN: ICD-10-PCS | Mod: S$PBB,,, | Performed by: INTERNAL MEDICINE

## 2023-03-16 NOTE — PROGRESS NOTES
Subjective:    Patient ID:  Papito Alberto is a 57 y.o. female patient here for evaluation Results (Echo and stress)      follow-up visit 2023:  Chest pain episodes resolved.  Compliant with medications.  No new symptoms.         History of Present Illness during initial visit:  57-old female with past medical history of hypertension, dyslipidemia, diabetes, TIA came here for initial evaluation.  Patient complains of 3 week history of intermittent chest tightness with exertion, which last few minutes and relieves with rest.  Patient states that the symptoms does not occur all the time with exertion but happens intermittently.  Symptoms are stable and not getting worse. Denies dyspnea, palpitations, dizziness.  Patient had a negative stress echo last year which was performed prior to her surgery.  Patient is compliant with medications.  Patient is asymptomatic today.          Review of patient's allergies indicates:  No Known Allergies    Past Medical History:   Diagnosis Date    Diabetes mellitus 2019    Hyperlipidemia 2017    Hypertension 2013    TIA (transient ischemic attack) 2013    x2 2013 2016     Past Surgical History:   Procedure Laterality Date     SECTION  [id number]    FUSION OF FACETS OF LUMBAR SPINE USING MINIMALLY INVASIVE TECHNIQUE  2006    HYSTERECTOMY      LAPAROSCOPIC CHOLECYSTECTOMY N/A 2022    Procedure: CHOLECYSTECTOMY, LAPAROSCOPIC;  Surgeon: Vasiliy Yin III, MD;  Location: [facility];  Service: General;  Laterality: N/A;    PARTIAL HYSTERECTOMY       Social History     Tobacco Use    Smoking status: Never    Smokeless tobacco: Never   Substance Use Topics    Alcohol use: Yes     Comment: occassionally    Drug use: Never        Review of Systems   Negative except as mentioned in HPI         Objective        Vitals:    23 1435   BP: 122/70   Pulse: 72       LIPIDS - LAST 2   No results found for: CHOL, HDL, LDLCALC, TRIG, CHOLHDL    CBC - LAST  2  Lab Results   Component Value Date    WBC 6.12 04/29/2022    WBC 5.83 01/28/2022    RBC 4.48 04/29/2022    RBC 4.52 01/28/2022    HGB 12.9 04/29/2022    HGB 13.2 01/28/2022    HCT 39.1 04/29/2022    HCT 40.3 01/28/2022    MCV 87 04/29/2022    MCV 89 01/28/2022    MCH 28.8 04/29/2022    MCH 29.2 01/28/2022    MCHC 33.0 04/29/2022    MCHC 32.8 01/28/2022    RDW 14.6 (H) 04/29/2022    RDW 14.7 (H) 01/28/2022     04/29/2022     01/28/2022    MPV 8.5 (L) 04/29/2022    MPV 8.9 (L) 01/28/2022    GRAN 3.0 04/29/2022    GRAN 48.4 04/29/2022    LYMPH 2.5 04/29/2022    LYMPH 40.7 04/29/2022    MONO 0.5 04/29/2022    MONO 8.3 04/29/2022    BASO 0.03 04/29/2022    BASO 0.04 01/28/2022    NRBC 0 04/29/2022    NRBC 0 01/28/2022       CHEMISTRY & LIVER FUNCTION - LAST 2  Lab Results   Component Value Date     04/29/2022     01/28/2022    K 4.0 04/29/2022    K 3.6 01/28/2022    CL 98 04/29/2022    CL 99 01/28/2022    CO2 26 04/29/2022    CO2 28 01/28/2022    ANIONGAP 17 (H) 04/29/2022    ANIONGAP 11 01/28/2022    BUN 11 04/29/2022    BUN 10 01/28/2022    CREATININE 1.1 04/29/2022    CREATININE 1.1 01/28/2022     (H) 04/29/2022     (H) 01/28/2022    CALCIUM 9.7 04/29/2022    CALCIUM 9.5 01/28/2022    ALBUMIN 4.5 04/29/2022    ALBUMIN 4.7 01/28/2022    PROT 8.5 (H) 04/29/2022    PROT 8.6 (H) 01/28/2022    ALKPHOS 61 04/29/2022    ALKPHOS 69 01/28/2022    ALT 21 04/29/2022    ALT 18 01/28/2022    AST 25 04/29/2022    AST 25 01/28/2022    BILITOT 0.5 04/29/2022    BILITOT 0.6 01/28/2022        CARDIAC PROFILE - LAST 2  No results found for: BNP, CPK, CPKMB, LDH, TROPONINI, TROPONINIHS     COAGULATION - LAST 2  No results found for: LABPT, INR, APTT    ENDOCRINE & PSA - LAST 2  No results found for: HGBA1C, MICROALBUR, TSH, PROCAL, PSA     ECHOCARDIOGRAM RESULTS  Results for orders placed in visit on 03/16/22    Echo    Interpretation Summary  · The left ventricle is normal in size with  concentric remodeling and normal systolic function.  · The estimated ejection fraction is 70%.  · Normal left ventricular diastolic function.  · Normal right ventricular size with normal right ventricular systolic function.  · Mild mitral regurgitation.  · Normal central venous pressure (3 mmHg).  · The estimated PA systolic pressure is 20 mmHg.      CURRENT/PREVIOUS VISIT EKG  Results for orders placed or performed in visit on 01/31/23   IN OFFICE EKG 12-LEAD (to [place])    Collection Time: 01/31/23  2:47 PM    Narrative    Test Reason : R07.9,    Vent. Rate : 099 BPM     Atrial Rate : 099 BPM     P-R Int : 190 ms          QRS Dur : 062 ms      QT Int : 346 ms       P-R-T Axes : 062 043 058 degrees     QTc Int : 444 ms    Sinus rhythm with Premature atrial complexes with Aberrant conduction  Possible Left atrial enlargement  Low voltage QRS  Cannot rule out Anteroseptal infarct (cited on or before 31-JAN-2022)  Abnormal ECG  When compared with ECG of 21-FEB-2022 17:06,  Aberrant conduction is now Present  Nonspecific T wave abnormality no longer evident in Inferior leads  Confirmed by Kemar Sharp MD ([id number]) on 2/27/2023 12:48:46 PM    Referred By:             Confirmed By:Kemar Sharp MD     No valid procedures specified.   No results found for this or any previous visit.    No valid procedures specified.          PREVIOUS STRESS TEST              PREVIOUS ANGIOGRAM        PHYSICAL EXAM    CONSTITUTIONAL: Well built, well nourished in no apparent distress  HEENT: No pallor  NECK: no JVD, no bruit  LUNGS: CTA b/l  HEART: regular rate and rhythm, S1, S2 normal, no murmur   ABDOMEN: soft, non-tender; bowel sounds normal  EXTREMITIES: No edema  NEURO: AAO X 3   SKIN:  No rash  Psych:  Normal affect    I HAVE REVIEWED :    The vital signs, nurses notes, and all the pertinent radiology and labs.        Current Outpatient Medications   Medication Instructions    amLODIPine (NORVASC) 10 mg, Oral, Daily    aspirin  (ECOTRIN) 81 mg, Oral, Daily    HYDROcodone-acetaminophen (NORCO) 5-325 mg per tablet 1 tablet, Oral, Every 6 hours PRN    lisinopriL-hydrochlorothiazide (PRINZIDE,ZESTORETIC) 20-12.5 mg per tablet 1 tablet, Oral, Daily    metFORMIN (GLUCOPHAGE) 1,000 mg, Oral, 2 times daily    multivitamin capsule 1 capsule, Oral, Daily    rosuvastatin (CRESTOR) 40 mg, Oral, Daily          Assessment & Plan     57-old female with past medical history of hypertension, dyslipidemia, diabetes, TIA came here for initial evaluation.  Intermittent chest pain with exertion:  Resolved  -exercise nuclear stress test negative for ischemia  -normal LV function on echocardiogram  -continue aspirin 81 mg in view of history of TIA  -continue current medications  -low-sodium, low-fat diet  -blood work with PCP including lipid profile.           Follow up in about 6 months (around 9/16/2023).

## 2023-07-10 DIAGNOSIS — N18.30 CKD (CHRONIC KIDNEY DISEASE), STAGE III: Primary | ICD-10-CM

## 2023-07-27 ENCOUNTER — HOSPITAL ENCOUNTER (OUTPATIENT)
Dept: RADIOLOGY | Facility: HOSPITAL | Age: 58
Discharge: HOME | End: 2023-07-27
Attending: INTERNAL MEDICINE
Payer: MEDICARE

## 2023-07-27 DIAGNOSIS — N18.30 CKD (CHRONIC KIDNEY DISEASE), STAGE III: ICD-10-CM

## 2023-07-27 PROCEDURE — 76770 US EXAM ABDO BACK WALL COMP: CPT | Mod: TC,PO

## 2024-05-23 DIAGNOSIS — Z12.31 ENCOUNTER FOR SCREENING MAMMOGRAM FOR MALIGNANT NEOPLASM OF BREAST: Primary | ICD-10-CM

## 2025-03-28 ENCOUNTER — OFFICE VISIT (OUTPATIENT)
Dept: URGENT CARE | Facility: CLINIC | Age: 60
End: 2025-03-28
Payer: MEDICARE

## 2025-03-28 VITALS
RESPIRATION RATE: 18 BRPM | TEMPERATURE: 99 F | HEART RATE: 101 BPM | OXYGEN SATURATION: 96 % | HEIGHT: 65 IN | BODY MASS INDEX: 35.99 KG/M2 | SYSTOLIC BLOOD PRESSURE: 104 MMHG | DIASTOLIC BLOOD PRESSURE: 63 MMHG | WEIGHT: 216 LBS

## 2025-03-28 DIAGNOSIS — M54.42 CHRONIC LEFT-SIDED LOW BACK PAIN WITH LEFT-SIDED SCIATICA: Primary | ICD-10-CM

## 2025-03-28 DIAGNOSIS — G89.29 CHRONIC LEFT-SIDED LOW BACK PAIN WITH LEFT-SIDED SCIATICA: Primary | ICD-10-CM

## 2025-03-28 RX ORDER — ESCITALOPRAM OXALATE 10 MG/1
1 TABLET ORAL DAILY
COMMUNITY

## 2025-03-28 RX ORDER — GABAPENTIN 100 MG/1
1 CAPSULE ORAL 3 TIMES DAILY
COMMUNITY
Start: 2022-11-07

## 2025-03-28 RX ORDER — TIZANIDINE 4 MG/1
4 TABLET ORAL EVERY 8 HOURS PRN
Qty: 21 TABLET | Refills: 0 | Status: SHIPPED | OUTPATIENT
Start: 2025-03-28

## 2025-03-28 RX ORDER — EMPAGLIFLOZIN 25 MG/1
TABLET, FILM COATED ORAL
COMMUNITY
Start: 2025-03-24 | End: 2025-09-20

## 2025-03-28 RX ORDER — DEXAMETHASONE SODIUM PHOSPHATE 4 MG/ML
4 INJECTION, SOLUTION INTRA-ARTICULAR; INTRALESIONAL; INTRAMUSCULAR; INTRAVENOUS; SOFT TISSUE
Status: COMPLETED | OUTPATIENT
Start: 2025-03-28 | End: 2025-03-28

## 2025-03-28 RX ORDER — EZETIMIBE 10 MG/1
1 TABLET ORAL DAILY
COMMUNITY

## 2025-03-28 RX ORDER — KETOROLAC TROMETHAMINE 30 MG/ML
30 INJECTION, SOLUTION INTRAMUSCULAR; INTRAVENOUS
Status: COMPLETED | OUTPATIENT
Start: 2025-03-28 | End: 2025-03-28

## 2025-03-28 RX ADMIN — DEXAMETHASONE SODIUM PHOSPHATE 4 MG: 4 INJECTION, SOLUTION INTRA-ARTICULAR; INTRALESIONAL; INTRAMUSCULAR; INTRAVENOUS; SOFT TISSUE at 01:03

## 2025-03-28 RX ADMIN — KETOROLAC TROMETHAMINE 30 MG: 30 INJECTION, SOLUTION INTRAMUSCULAR; INTRAVENOUS at 01:03

## 2025-03-28 NOTE — PROGRESS NOTES
"Subjective:      Patient ID: Papito Alberto is a 59 y.o. female.    Vitals:  height is 5' 5" (1.651 m) and weight is 98 kg (216 lb). Her oral temperature is 98.8 °F (37.1 °C). Her blood pressure is 104/63 and her pulse is 101. Her respiration is 18 and oxygen saturation is 96%.     Chief Complaint: Back Pain    Patient is a 59-year-old female with a past medical history of hypertension, hyperlipidemia, type 2 diabetes, TIA, anxiety, depression, chronic kidney disease and neuropathy presents to clinic for evaluation of sciatica.  Patient has chronic back pain since around 2006 after back surgery.  Patient states has had intermittent flares.  Patient states has had a flare for a couple of weeks now.  Patient states no known trauma or injury.  Patient denies any constipation.  Patient denies any kidney stones.  Patient denies any concern for urinary symptoms.  Patient states that she is hurting in her lower back.  Patient states primarily in the left side.  Patient states pain radiates down to the buttocks into the foot.  Patient states sharp stabbing type sensation.  Patient states pain 10 on 10 scale.  Patient states that she has not experienced any bowel or urinary dysfunction, saddle anesthesia, or paresthesias of the lower extremities.    Back Pain  This is a recurrent problem. The current episode started 1 to 4 weeks ago. The problem occurs constantly. The problem has been rapidly worsening since onset. The pain is present in the lumbar spine and gluteal. The quality of the pain is described as aching. The pain radiates to the left thigh. The pain is at a severity of 10/10. The symptoms are aggravated by standing. Stiffness is present In the morning. Pertinent negatives include no abdominal pain, bladder incontinence, bowel incontinence, chest pain, dysuria, fever, headaches, numbness or pelvic pain. She has tried muscle relaxant and heat for the symptoms. The treatment provided mild relief. "       Constitution: Negative for chills, sweating, fatigue and fever.   HENT: Negative.     Neck: neck negative. Negative for neck pain.   Cardiovascular: Negative.  Negative for chest pain and palpitations.   Eyes: Negative.    Respiratory: Negative.  Negative for chest tightness, cough and shortness of breath.    Gastrointestinal: Negative.  Negative for abdominal pain, nausea, vomiting, constipation and bowel incontinence.   Endocrine: negative.   Genitourinary: Negative.  Negative for dysuria, frequency, urgency, urine decreased, flank pain, bladder incontinence, vaginal discharge, vaginal bleeding, vaginal odor and pelvic pain.   Musculoskeletal:  Positive for back pain. Negative for trauma.   Skin: Negative.  Negative for color change, pale, rash and erythema.   Allergic/Immunologic: Negative.    Neurological: Negative.  Negative for dizziness, light-headedness, passing out, headaches, disorientation, altered mental status, numbness and tingling.   Hematologic/Lymphatic: Negative.    Psychiatric/Behavioral: Negative.  Negative for altered mental status, disorientation and confusion.       Objective:     Physical Exam   Constitutional: She is oriented to person, place, and time. She appears well-developed. She is cooperative.  Non-toxic appearance. She does not appear ill. No distress.   HENT:   Head: Normocephalic and atraumatic.   Ears:   Right Ear: Hearing, external ear and ear canal normal.   Left Ear: Hearing and external ear normal.   Nose: Nose normal. No mucosal edema or nasal deformity. No epistaxis. Right sinus exhibits no maxillary sinus tenderness and no frontal sinus tenderness. Left sinus exhibits no maxillary sinus tenderness and no frontal sinus tenderness.   Mouth/Throat: Uvula is midline, oropharynx is clear and moist and mucous membranes are normal. Mucous membranes are moist. No trismus in the jaw. Normal dentition. No uvula swelling. Oropharynx is clear.   Eyes: Conjunctivae and lids are  normal. Pupils are equal, round, and reactive to light. Right eye exhibits no discharge. Left eye exhibits no discharge. No scleral icterus.   Neck: Trachea normal and phonation normal. Neck supple. No neck rigidity present.   Cardiovascular: Regular rhythm, normal heart sounds and normal pulses. Tachycardia present.   Pulmonary/Chest: Effort normal and breath sounds normal. No respiratory distress. She has no wheezes. She has no rhonchi. She has no rales.   Abdominal: Normal appearance and bowel sounds are normal. She exhibits no distension. Soft. There is no abdominal tenderness.   Musculoskeletal:      Cervical back: She exhibits no tenderness.      Lumbar back: She exhibits decreased range of motion, tenderness and spasm. She exhibits no swelling.        Back:    Neurological: She is alert and oriented to person, place, and time. She exhibits normal muscle tone. Gait (Use of assistive device, crutches) abnormal.   Skin: Skin is warm, dry, intact, not diaphoretic, not pale and no rash. Capillary refill takes less than 2 seconds. No erythema   Psychiatric: Her speech is normal and behavior is normal. Judgment and thought content normal.   Nursing note and vitals reviewed.      Assessment:     1. Chronic left-sided low back pain with left-sided sciatica        Plan:       Chronic left-sided low back pain with left-sided sciatica    Other orders  -     ketorolac injection 30 mg  -     dexAMETHasone injection 4 mg  -     tiZANidine (ZANAFLEX) 4 MG tablet; Take 1 tablet (4 mg total) by mouth every 8 (eight) hours as needed (Back pain).  Dispense: 21 tablet; Refill: 0                History and physical discussed with patient.  Patient aware of chronic kidney disease and type 2 diabetes.  Patient states will limit over-the-counter NSAIDs and watch diet and blood sugar closely over the next week.  Decadron 4 mg IM and Toradol 30 mg IM in clinic.  Patient tolerated well.  No complications noted.  Take medications as  prescribed.  Use of no muscle relaxers while working, driving, or operating heavy machinery.    Use of no muscle relaxers in combination with other benzodiazepines, prescribed muscle relaxers or opioids while on currently prescribed muscle relaxers.  Recommend Tylenol per package instructions for pain.  Recommend rotating ice and warm moist heat as directed.  Follow-up with PCP in 1-2 days.  Return to clinic as needed.  To ED for any new or acutely worsening symptoms.  Patient in agreement with plan of care.    DISCLAIMER: Please note that my documentation in this Electronic Healthcare Record was produced using speech recognition software and therefore may contain errors related to that software system.These could include grammar, punctuation and spelling errors or the inclusion/exclusion of phrases that were not intended. Garbled syntax, mangled pronouns, and other bizarre constructions may be attributed to that software system.

## 2025-08-27 ENCOUNTER — OFFICE VISIT (OUTPATIENT)
Dept: URGENT CARE | Facility: CLINIC | Age: 60
End: 2025-08-27
Payer: MEDICARE

## 2025-08-27 VITALS
WEIGHT: 216 LBS | OXYGEN SATURATION: 99 % | BODY MASS INDEX: 35.99 KG/M2 | HEIGHT: 65 IN | DIASTOLIC BLOOD PRESSURE: 73 MMHG | SYSTOLIC BLOOD PRESSURE: 104 MMHG | HEART RATE: 86 BPM | TEMPERATURE: 99 F | RESPIRATION RATE: 19 BRPM

## 2025-08-27 DIAGNOSIS — M54.42 CHRONIC LEFT-SIDED LOW BACK PAIN WITH LEFT-SIDED SCIATICA: Primary | ICD-10-CM

## 2025-08-27 DIAGNOSIS — G89.29 CHRONIC LEFT-SIDED LOW BACK PAIN WITH LEFT-SIDED SCIATICA: Primary | ICD-10-CM

## 2025-08-27 RX ORDER — DEXAMETHASONE SODIUM PHOSPHATE 4 MG/ML
4 INJECTION, SOLUTION INTRA-ARTICULAR; INTRALESIONAL; INTRAMUSCULAR; INTRAVENOUS; SOFT TISSUE
Status: COMPLETED | OUTPATIENT
Start: 2025-08-27 | End: 2025-08-27

## 2025-08-27 RX ORDER — TIZANIDINE 4 MG/1
4 TABLET ORAL EVERY 8 HOURS PRN
Qty: 21 TABLET | Refills: 0 | Status: SHIPPED | OUTPATIENT
Start: 2025-08-27

## 2025-08-27 RX ORDER — KETOROLAC TROMETHAMINE 30 MG/ML
30 INJECTION, SOLUTION INTRAMUSCULAR; INTRAVENOUS
Status: COMPLETED | OUTPATIENT
Start: 2025-08-27 | End: 2025-08-27

## 2025-08-27 RX ORDER — AMLODIPINE BESYLATE 5 MG/1
5 TABLET ORAL
COMMUNITY
Start: 2025-06-24

## 2025-08-27 RX ORDER — METFORMIN HYDROCHLORIDE 500 MG/1
500 TABLET ORAL 2 TIMES DAILY
COMMUNITY
Start: 2025-06-24

## 2025-08-27 RX ADMIN — KETOROLAC TROMETHAMINE 30 MG: 30 INJECTION, SOLUTION INTRAMUSCULAR; INTRAVENOUS at 03:08

## 2025-08-27 RX ADMIN — DEXAMETHASONE SODIUM PHOSPHATE 4 MG: 4 INJECTION, SOLUTION INTRA-ARTICULAR; INTRALESIONAL; INTRAMUSCULAR; INTRAVENOUS; SOFT TISSUE at 03:08

## (undated) DEVICE — CABLE MONOPOLAR 10FT DISPOSABLE

## (undated) DEVICE — STERISTRIP 1/2 R1547

## (undated) DEVICE — TROCAR OPTICAL ZTHREAD 5MMX100MM CTF03

## (undated) DEVICE — SOLUTION NACL 0.9% 3000ML

## (undated) DEVICE — APPLIER CLIP  5MM

## (undated) DEVICE — SLEEVE TROCAR 5MMX100MM  CTS02

## (undated) DEVICE — ELECTRODE OLSEN HOOK L-SHAPED 13INX330MM

## (undated) DEVICE — Device

## (undated) DEVICE — SUTURE VICRYL #0 27 UR-6 VCP603H

## (undated) DEVICE — SOLUTION IRRI H2O BOTTLE 1000ML

## (undated) DEVICE — RETRIEVER ENDOPOUCH SPEC BAG

## (undated) DEVICE — SWABSTICK BENZOIN S42450

## (undated) DEVICE — SOLUTION IRRI NS BOTTLE 1000ML R5200-01

## (undated) DEVICE — PAD BOVIE ADULT

## (undated) DEVICE — TROCAR BALL. BLNT HASSON C0R47

## (undated) DEVICE — SUCTION/IRRIGATOR W/TIP

## (undated) DEVICE — TRAY GENERAL LAPAROSCOPY

## (undated) DEVICE — DERMABOND HVD MINI  DHVM12

## (undated) DEVICE — GLOVE BIOGEL MICRO SURGEON PINK SZ 7.5

## (undated) DEVICE — DRESSING MEPORE 2.5 X 3   670800

## (undated) DEVICE — SUTURE MONOCRYL 4-0 PS-2 27 MCP426H

## (undated) DEVICE — UNDERGLOVE BIOGEL PI MICRO BLUE SZ 8

## (undated) DEVICE — SCISSORS 5MM APPLIED MEDICAL   CB030

## (undated) DEVICE — NEEDLE INSUFFLATION 120MM 172015